# Patient Record
Sex: MALE | Race: WHITE | NOT HISPANIC OR LATINO | URBAN - METROPOLITAN AREA
[De-identification: names, ages, dates, MRNs, and addresses within clinical notes are randomized per-mention and may not be internally consistent; named-entity substitution may affect disease eponyms.]

---

## 2022-10-17 DIAGNOSIS — F31.9 BIPOLAR 1 DISORDER (HCC): Primary | ICD-10-CM

## 2022-10-17 NOTE — TELEPHONE ENCOUNTER
Patient left message that appt was missed and would need refills  Sending refill request to provider and message to  of appt needed  Medication Refill Request     Name of Medication gabapentin  Dose/Frequency 300mg and 100mg qhs  Quantity 30   Verified pharmacy   [x]  Verified ordering Provider   [x]  Does patient have enough for the next 3 days? Yes [] No [x]  Does patient have a follow-up appointment scheduled? Yes [] No [x]   If so when is appointment:APPT NEEDED, MESSAGE FORWARDED TO     Medication Refill Request     Name of Medication abilify  Dose/Frequency 30mg qd  Quantity 30  Verified pharmacy   [x]  Verified ordering Provider   [x]  Does patient have enough for the next 3 days? Yes [] No [x]  Does patient have a follow-up appointment scheduled? Yes [] No [x]   If so when is appointment: appt needed    Medication Refill Request     Name of Medication benztropine  Dose/Frequency 1mg qd  Quantity 30  Verified pharmacy   [x]  Verified ordering Provider   [x]  Does patient have enough for the next 3 days? Yes [] No [x]  Does patient have a follow-up appointment scheduled? Yes [] No [x]   If so when is appointment: appt needed    Medication Refill Request     Name of Medication fluvoxamine  Dose/Frequency 100mg (3) qhs  Quantity 90  Verified pharmacy   [x]  Verified ordering Provider   [x]  Does patient have enough for the next 3 days? Yes [] No [x]  Does patient have a follow-up appointment scheduled?  Yes [] No [x]   If so when is appointment: appt needed

## 2022-10-18 RX ORDER — GABAPENTIN 300 MG/1
300 CAPSULE ORAL
Qty: 30 CAPSULE | Refills: 0 | Status: SHIPPED | OUTPATIENT
Start: 2022-10-18

## 2022-10-18 RX ORDER — BENZTROPINE MESYLATE 1 MG/1
1 TABLET ORAL
Qty: 30 TABLET | Refills: 0 | Status: SHIPPED | OUTPATIENT
Start: 2022-10-18

## 2022-10-18 RX ORDER — ARIPIPRAZOLE 30 MG/1
30 TABLET ORAL DAILY
Qty: 30 TABLET | Refills: 0 | Status: SHIPPED | OUTPATIENT
Start: 2022-10-18

## 2022-10-18 RX ORDER — GABAPENTIN 100 MG/1
100 CAPSULE ORAL 2 TIMES DAILY
Qty: 60 CAPSULE | Refills: 0 | Status: SHIPPED | OUTPATIENT
Start: 2022-10-18

## 2022-10-18 RX ORDER — FLUVOXAMINE MALEATE 100 MG
300 TABLET ORAL
Qty: 90 TABLET | Refills: 0 | Status: SHIPPED | OUTPATIENT
Start: 2022-10-18

## 2022-10-25 ENCOUNTER — TELEMEDICINE (OUTPATIENT)
Dept: PSYCHIATRY | Facility: CLINIC | Age: 27
End: 2022-10-25

## 2022-10-25 DIAGNOSIS — F41.1 GENERALIZED ANXIETY DISORDER: ICD-10-CM

## 2022-10-25 DIAGNOSIS — F31.9 BIPOLAR 1 DISORDER (HCC): Primary | ICD-10-CM

## 2022-10-25 RX ORDER — HYDROCHLOROTHIAZIDE 25 MG/1
25 TABLET ORAL DAILY
COMMUNITY
Start: 2022-09-15

## 2022-10-25 RX ORDER — LOSARTAN POTASSIUM 50 MG/1
50 TABLET ORAL DAILY
COMMUNITY
Start: 2022-10-07

## 2022-10-25 RX ORDER — PANTOPRAZOLE SODIUM 40 MG/1
TABLET, DELAYED RELEASE ORAL
COMMUNITY
Start: 2022-10-12

## 2022-10-25 NOTE — PSYCH
Virtual Regular Visit    Verification of patient location:    Patient is located in the following state in which I hold an active license [place]      Assessment/Plan:  Pt will stop using THC  Continue meds    Problem List Items Addressed This Visit    None     Visit Diagnoses     Bipolar 1 disorder ([address] )    -  Primary    Generalized anxiety disorder              Goals addressed in session: Goal 1          Reason for visit is   Chief Complaint   Patient presents with   • Medication Management        Encounter provider Jnena Farias MD    Provider located at [address] [facility]  [address]  [phone]      Recent Visits  No visits were found meeting these conditions  Showing recent visits within past 7 days and meeting all other requirements  Today's Visits  Date Type Provider Dept   10/25/22 Telemedicine Jenna Farias, [facility] today's visits and meeting all other requirements  Future Appointments  No visits were found meeting these conditions  Showing future appointments within next 150 days and meeting all other requirements       The patient was identified by name and date of birth  Geno Berger was informed that this is a telemedicine visit and [facility] the [facility]  He agrees to proceed     My office door was closed  No one else was in the room  He acknowledged consent and understanding of privacy and security of the video platform  The patient has agreed to participate and understands they can discontinue the visit at any time  Patient is aware this is a billable service  Subjective  Geno Berger is a 32 y o  male with mood swings and anxiety presents for f/u   Compliant with meds, denies SE  Pt c/o feeling more anxious; mild mood swings  Denies stressors or medical problems     Increased THC use - liquid - 4-5 x day      HPI   Mood - elba swings from " normal" to depressed; 1-2 x week - low self esteem, sad - continues to work and do ADLs  Anxiety - increased obsessive /intrusive thoughts; "all the time'; " fear of going crazy" - for 1-2 weeks; needs reassurance from family  Past Medical History:   Diagnosis Date   • GERD (gastroesophageal reflux disease)    • Hypertension        No past surgical history on file  Current Outpatient Medications   Medication Sig Dispense Refill   • ARIPiprazole (ABILIFY) 30 mg tablet Take 1 tablet (30 mg total) by mouth daily 30 tablet 0   • benztropine (COGENTIN) 1 mg tablet Take 1 tablet (1 mg total) by mouth daily at bedtime 30 tablet 0   • fluvoxaMINE (LUVOX) 100 mg tablet Take 3 tablets (300 mg total) by mouth daily at bedtime 90 tablet 0   • gabapentin (NEURONTIN) 100 mg capsule Take 1 capsule (100 mg total) by mouth 2 (two) times a day 60 capsule 0   • gabapentin (NEURONTIN) 300 mg capsule Take 1 capsule (300 mg total) by mouth daily at bedtime 30 capsule 0   • hydrochlorothiazide (HYDRODIURIL) 25 mg tablet Take 25 mg by mouth daily     • losartan (COZAAR) 50 mg tablet Take 50 mg by mouth daily     • pantoprazole (PROTONIX) 40 mg tablet        No current facility-administered medications for this visit  Not on File    Review of Systems   Constitutional: Negative for activity change and appetite change  Psychiatric/Behavioral: Negative for sleep disturbance (6 hrs) and suicidal ideas  Video Exam    There were no vitals filed for this visit  Physical Exam  Constitutional:       Appearance: Normal appearance  He is normal weight  Neurological:      Mental Status: He is alert  Psychiatric:         Attention and Perception: Attention and perception normal          Mood and Affect: Mood is anxious  Affect is blunt  Speech: Speech normal          Behavior: Behavior is cooperative  Thought Content:  Thought content normal          Judgment: Judgment normal           Visit Time    Visit Start Time: 4 36 pm  Visit Stop Time: 4 47 pm  Total Visit Duration: 20

## 2022-12-23 DIAGNOSIS — F31.9 BIPOLAR 1 DISORDER (HCC): ICD-10-CM

## 2023-01-03 RX ORDER — GABAPENTIN 100 MG/1
100 CAPSULE ORAL 2 TIMES DAILY
Qty: 60 CAPSULE | Refills: 0 | Status: SHIPPED | OUTPATIENT
Start: 2023-01-03

## 2023-01-03 RX ORDER — GABAPENTIN 300 MG/1
300 CAPSULE ORAL
Qty: 30 CAPSULE | Refills: 0 | Status: SHIPPED | OUTPATIENT
Start: 2023-01-03

## 2023-01-03 RX ORDER — ARIPIPRAZOLE 30 MG/1
30 TABLET ORAL DAILY
Qty: 30 TABLET | Refills: 0 | Status: SHIPPED | OUTPATIENT
Start: 2023-01-03

## 2023-01-03 RX ORDER — BENZTROPINE MESYLATE 1 MG/1
1 TABLET ORAL
Qty: 30 TABLET | Refills: 0 | Status: SHIPPED | OUTPATIENT
Start: 2023-01-03

## 2023-01-03 RX ORDER — FLUVOXAMINE MALEATE 100 MG
300 TABLET ORAL
Qty: 90 TABLET | Refills: 0 | Status: SHIPPED | OUTPATIENT
Start: 2023-01-03

## 2023-02-16 ENCOUNTER — TELEMEDICINE (OUTPATIENT)
Dept: PSYCHIATRY | Facility: CLINIC | Age: 28
End: 2023-02-16

## 2023-02-16 DIAGNOSIS — F41.1 GENERALIZED ANXIETY DISORDER: ICD-10-CM

## 2023-02-16 DIAGNOSIS — F31.9 BIPOLAR 1 DISORDER (HCC): Primary | ICD-10-CM

## 2023-02-16 RX ORDER — GABAPENTIN 300 MG/1
300 CAPSULE ORAL
Qty: 30 CAPSULE | Refills: 1 | Status: SHIPPED | OUTPATIENT
Start: 2023-02-16

## 2023-02-16 RX ORDER — FLUVOXAMINE MALEATE 100 MG
300 TABLET ORAL
Qty: 90 TABLET | Refills: 1 | Status: SHIPPED | OUTPATIENT
Start: 2023-02-16

## 2023-02-16 RX ORDER — HALOPERIDOL 0.5 MG/1
0.5 TABLET ORAL
Qty: 30 TABLET | Refills: 1 | Status: SHIPPED | OUTPATIENT
Start: 2023-02-16

## 2023-02-16 RX ORDER — BENZTROPINE MESYLATE 1 MG/1
1 TABLET ORAL
Qty: 30 TABLET | Refills: 1 | Status: SHIPPED | OUTPATIENT
Start: 2023-02-16

## 2023-02-16 RX ORDER — ARIPIPRAZOLE 30 MG/1
30 TABLET ORAL DAILY
Qty: 30 TABLET | Refills: 1 | Status: SHIPPED | OUTPATIENT
Start: 2023-02-16

## 2023-02-16 NOTE — PATIENT INSTRUCTIONS
Stop neurontin 100 mg  bid, continue 300 mg hs - pt reports no effect  Continue other meds  Add haldol 0 5 mg hs  Pt was recommended to stop smoking weed

## 2023-02-16 NOTE — PSYCH
Virtual Regular Visit    Verification of patient location:    Patient is located in the following state in which I hold an active license [place]      Assessment/Plan:    Problem List Items Addressed This Visit    None  Visit Diagnoses     Bipolar 1 disorder ([address] )    -  Primary    Relevant Medications    gabapentin (NEURONTIN) 300 mg capsule    fluvoxaMINE (LUVOX) 100 mg tablet    benztropine (COGENTIN) 1 mg tablet    ARIPiprazole (ABILIFY) 30 mg tablet    haloperidol (HALDOL) 0 5 mg tablet    Generalized anxiety disorder        Relevant Medications    fluvoxaMINE (LUVOX) 100 mg tablet    ARIPiprazole (ABILIFY) 30 mg tablet    haloperidol (HALDOL) 0 5 mg tablet          Goals addressed in session: Goal 1          Reason for visit is   Chief Complaint   Patient presents with   • Medication Management        Encounter provider Sonia Lopez MD    Provider located at [address] [facility]  [address]  [phone]      Recent Visits  No visits were found meeting these conditions  Showing recent visits within past 7 days and meeting all other requirements  Today's Visits  Date Type Provider Dept   02/16/23 Telemedicine Sonia Lopez, [address] [place] [address] today's visits and meeting all other requirements  Future Appointments  No visits were found meeting these conditions  Showing future appointments within next 150 days and meeting all other requirements       The patient was identified by name and date of birth  Olga Yun was informed that this is a telemedicine visit and [facility] the [facility]  He agrees to proceed     My office door was closed  No one else was in the room  He acknowledged consent and understanding of privacy and security of the video platform  The patient has agreed to participate and understands they can discontinue the visit at any time      Patient is aware this is a billable service  Subjective  Troy Clark is a 29 y o  male with bipolar do and anxiety presents for f/u    Last appt in October, didn`t f/u as recommended  Continues to smoke weed 4-5 x day  Collateral obtained from mother - as per her, episodes of vomiting? Pt and his mother are poor historian    HPI   Mood - " very bad" - remains depressed, " all the time" - didn`t elaborate much  Anxiety - racing thoughts all the time, worse at night, restless, pacing, fear of "demons" and negative intrusive thoughts  Pt reports " thoughts are controlling me"    Past Medical History:   Diagnosis Date   • GERD (gastroesophageal reflux disease)    • Hypertension        History reviewed  No pertinent surgical history  Current Outpatient Medications   Medication Sig Dispense Refill   • ARIPiprazole (ABILIFY) 30 mg tablet Take 1 tablet (30 mg total) by mouth daily 30 tablet 1   • benztropine (COGENTIN) 1 mg tablet Take 1 tablet (1 mg total) by mouth daily at bedtime 30 tablet 1   • fluvoxaMINE (LUVOX) 100 mg tablet Take 3 tablets (300 mg total) by mouth daily at bedtime 90 tablet 1   • gabapentin (NEURONTIN) 300 mg capsule Take 1 capsule (300 mg total) by mouth daily at bedtime 30 capsule 1   • haloperidol (HALDOL) 0 5 mg tablet Take 1 tablet (0 5 mg total) by mouth daily at bedtime 30 tablet 1   • hydrochlorothiazide (HYDRODIURIL) 25 mg tablet Take 25 mg by mouth daily     • losartan (COZAAR) 50 mg tablet Take 50 mg by mouth daily     • pantoprazole (PROTONIX) 40 mg tablet        No current facility-administered medications for this visit  Not on File    Review of Systems   Constitutional: Negative for activity change and appetite change  Psychiatric/Behavioral: Positive for dysphoric mood and sleep disturbance (poor sleep)  Negative for suicidal ideas  The patient is nervous/anxious  Video Exam    There were no vitals filed for this visit      Physical Exam  Constitutional:       Appearance: Normal appearance  He is normal weight  Neurological:      Mental Status: He is alert  Psychiatric:         Attention and Perception: Perception normal  He is inattentive  Mood and Affect: Mood is anxious and depressed  Affect is blunt  Speech: Speech normal          Behavior: Behavior is cooperative  Thought Content:  Thought content normal          Judgment: Judgment normal           Visit Time    Visit Start Time: 5 59 pm   Visit Stop Time: 6 08 pm   Total Visit Duration: 17 minutes

## 2023-05-12 ENCOUNTER — TELEPHONE (OUTPATIENT)
Dept: PSYCHIATRY | Facility: CLINIC | Age: 28
End: 2023-05-12

## 2023-05-15 ENCOUNTER — TELEMEDICINE (OUTPATIENT)
Dept: PSYCHIATRY | Facility: CLINIC | Age: 28
End: 2023-05-15

## 2023-05-15 DIAGNOSIS — F42.9 OBSESSIVE-COMPULSIVE DISORDER, UNSPECIFIED TYPE: ICD-10-CM

## 2023-05-15 DIAGNOSIS — F31.9 BIPOLAR 1 DISORDER (HCC): Primary | ICD-10-CM

## 2023-05-15 NOTE — PSYCH
"  Virtual Regular Visit    Verification of patient location:    Patient is located at Other in the following state in which I hold an active license [place]      Assessment/Plan:    Problem List Items Addressed This Visit    None      Goals addressed in session: Goal 1          Reason for visit is   Chief Complaint   Patient presents with   • Medication Management        Encounter provider Karla Concepcion MD    Provider located at [address] [facility]  [address]  [phone]      Recent Visits  Date Type Provider Dept   05/12/23 Telephone Provider [address] [facility] recent visits within past 7 days and meeting all other requirements  Today's Visits  Date Type Provider Dept   05/15/23 Telemedicine Karla Concepcion, [facility] today's visits and meeting all other requirements  Future Appointments  No visits were found meeting these conditions  Showing future appointments within next 150 days and meeting all other requirements       The patient was identified by name and date of birth  Rosalinda Betancur was informed that this is a telemedicine visit and that the [facility]  He agrees to proceed     My office door was closed  No one else was in the room  He acknowledged consent and understanding of privacy and security of the video platform  The patient has agreed to participate and understands they can discontinue the visit at any time  Patient is aware this is a billable service       Subjective  Rosalinda Betancur is a 29 y o  male with bipolar do and anxiety presents for regular f/u     compliant with meds, denies SE  Reports cutting down weed use  Works  Denies acute medical problems        HPI   Mood - reports as \" same\", no improvement - depressed, restless  meds are not effective  Anxiety - continues to c/o racing / obsessive thoughts, \" all the " "time\"    Past Medical History:   Diagnosis Date   • GERD (gastroesophageal reflux disease)    • Hypertension        History reviewed  No pertinent surgical history  Current Outpatient Medications   Medication Sig Dispense Refill   • ARIPiprazole (ABILIFY) 30 mg tablet Take 1 tablet (30 mg total) by mouth daily 30 tablet 1   • benztropine (COGENTIN) 1 mg tablet Take 1 tablet (1 mg total) by mouth 2 (two) times a day 60 tablet 1   • fluvoxaMINE (LUVOX) 100 mg tablet Take 3 tablets (300 mg total) by mouth daily at bedtime 90 tablet 1   • gabapentin (NEURONTIN) 300 mg capsule Take 1 capsule (300 mg total) by mouth daily at bedtime 30 capsule 1   • haloperidol (HALDOL) 1 mg tablet Take 1 tablet (1 mg total) by mouth daily at bedtime 30 tablet 1   • hydrochlorothiazide (HYDRODIURIL) 25 mg tablet Take 25 mg by mouth daily     • losartan (COZAAR) 50 mg tablet Take 50 mg by mouth daily     • pantoprazole (PROTONIX) 40 mg tablet        No current facility-administered medications for this visit  Not on File    Review of Systems   Constitutional: Negative for activity change and appetite change  Psychiatric/Behavioral: Positive for dysphoric mood  Negative for sleep disturbance and suicidal ideas  The patient is nervous/anxious  Video Exam    There were no vitals filed for this visit  Physical Exam  Constitutional:       Appearance: Normal appearance  He is normal weight  Neurological:      Mental Status: He is alert  Psychiatric:         Attention and Perception: Attention and perception normal          Mood and Affect: Mood is anxious and depressed  Affect is blunt  Speech: Speech normal          Behavior: Behavior normal  Behavior is cooperative  Thought Content:  Thought content normal          Judgment: Judgment normal           Visit Time    Visit Start Time: 4 37 pm   Visit Stop Time: 4 48 pm   Total Visit Duration: 18 minutes      "

## 2023-05-24 ENCOUNTER — TELEMEDICINE (OUTPATIENT)
Dept: BEHAVIORAL/MENTAL HEALTH CLINIC | Facility: CLINIC | Age: 28
End: 2023-05-24

## 2023-05-24 DIAGNOSIS — F17.209 NICOTINE-RELATED DISORDER: ICD-10-CM

## 2023-05-24 DIAGNOSIS — F41.1 GENERALIZED ANXIETY DISORDER: Primary | ICD-10-CM

## 2023-05-24 DIAGNOSIS — F42.9 OBSESSIVE-COMPULSIVE DISORDER, UNSPECIFIED TYPE: ICD-10-CM

## 2023-05-24 DIAGNOSIS — F12.99 CANNABIS-RELATED DISORDER (HCC): ICD-10-CM

## 2023-05-24 DIAGNOSIS — F32.A DEPRESSION, UNSPECIFIED DEPRESSION TYPE: ICD-10-CM

## 2023-05-24 NOTE — PSYCH
"Client's mood was dysphoric and affect was restricted  Session occurred virtually  Client's thought content was logical and speech was within normal limits  Client's insight and judgement appeared good  Presenting Problem: Elias Ayala reported both he and his [facility] med provider, Dr Mik Saeed, co-referred him to services  Client explained his impetus for psychotherapy is \"the recent pattern of my mental health\" and \"same stuff I always have with intrusive thoughts and overanalyzing everything  \" Client noted his intrusive thoughts \"change a lot\" and usually consist of his fears such as \"what if I was a serial killer  \" Client added the thoughts usually concentrate on him engaging in some unwanted behavior or concern of bad things happening to him  Elias Ayala disclosed \"I don't do anything; I spend all weekend sleeping for an insane amount of hours  \" Client conveyed he predominately leaves the house solely to attend his job  Elias Ayala revealed \"I get fears of me going crazy\" and this causes him to avoid social situations  Client shared \"I feel like I'm in a cloud; I'm stuck  \" In regards to stressors, Lianaquincy Jamie explained \"I feel like me, myself, my own thoughts  \" Client denied external stressors  Client reported he initially began to flounder with his mental health at age 21 or 24 when it \"out of nowhere got bad  \" Client noted his current episode has lasted six years  Client identified coping as cannabis and video games  Administered a DSM-5-TR Self-Rated Level 1 Cross-Cutting Symptom Measure and he produced elevated scores in the domains of depression, anger, anxiety, somatic symptoms, suicidal ideation, sleep problems, memory, repetitive thoughts/behaviors, dissociation, personality functioning, and substance use (see scan)   Additional information was gathered on the following item: 17- Client expressed \"I tell myself constantly that I'm not what I think I am or I ask my mom the same question over and over again until I feel " "comfortable not asking it  \"      Administered a PHQ-9 and he screened moderate severity with a score of 13  Administered a ELIZABETH-7 and he screened severe severity with a score of 17  Administered an OCI-R and he screened negative for OCD with a score of 12 (see scan)  Administered The Mood Disorder Questionnaire and he screened negative for bipolar disorder (see scan)  Administered an ACE Questionnaire and he scored a 4 (see scan)  Risk: María Bruno revealed SI consists of \"I'd be better off dead; not that'd I do anything to myself; I just lost hope  \" Client denied plan/intent for suicide  Client denied HI  María Bruno shared he encounters thoughts of \"what if I were to hurt or kill a loved one\" and \"deep down I know I would never do these things  \" Client described the thoughts as a \"stuck thought\" and thoughts that present against his will  Client denied SIB and previous suicide attempt  Medication: María Bruno stated he is prescribed Abilify-dosage unknown-once daily  Legal: Client denied legal history  Culture: María Bruno identified as White, Male, and Straight  Client denied [employer]  Physical Health: Client denied current physiological ailments  María Bruno conveyed he suffered two concussions at either age 12 or 16  Previous  Treatment: María Bruno conveyed he has been actively involved in treatment at [facility] for four years with his most recent individual psychotherapy in 2021  Client denied history of psychiatric hospitalizations  Client expressed he attended [facility] for two days of PHP in 2018  Substance Use:   Alcohol: Client reported first use at age 16 with most recent use being a few weeks ago  Client noted he uses alcohol five to seven days per month and will consume four to five drinks per episode  Cannabis: María Bruno noted first use at age 13 and most recent use being today  Client noted daily use via vaping  Client explained someone obtains cannabis for him from a medical dispensary   " "   Nicotine: Client conveyed first use was age 13 and most recent use was today  Client noted daily use of chewing tobacco, long cut, where it takes him two days to complete one tin  Caffeine: Elizabeth Arzate stated he uses three to four days per week and he will consume two red solo cups of soda per episode  Trauma: Client denied emotional, sexual, and physical abuse  Client conveyed his parents were \"addicts\" during his childhood and he \"grew up poor  \" Elizabeth Arzate revealed at age 12 his mother overdosed on Xanax and at age 21 he walked in on his father attempting suicide  Education: Elizabeth Arzate stated he graduated high school in 2014  Vocation: 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  Client noted he has been there for four years and currently completes 50 hours weekly  Family: Elizabeth Arzate shared his biological parents are   Client noted the relationship with his father is \"not bad; we could talk more but it's not bad  \" Client expressed the relationship with his mother is \"real close  \" Client revealed he has two full sisters, ages 32 and 24, and one maternal half brother who is age 35  Client added none of his relationships with his siblings stand out for being especially good or especially poor  Client denied children and a current romantic partner  Elizabeth Arzate disclosed \"I have lost a lot of friends because of my mental issues  \" Client noted he has two friends currently  Pertaining to family history of mental illness, client explained his mother has anxiety and his father has depression  Client added his youngest sister experiences anxiety and intrusive thoughts uvaldo to him  Elizabeth Arzate conveyed his parents previously struggled with misuse of prescription pain pills  Client added his brother was a \"heroine addict\" and is presently clean  Goals: Elizabeth Arzate stated Piyush Peer how to be my normal self again\" and \"learn how to control my thoughts  \"      Summary: Client is a 29year old, White, Straight, male who " co-referred to services with his [facility] med provider  Client presents with symptoms reflective of an anxiety disorder, an obsessive-compulsive and related disorder, a depressive disorder, a nicotine use disorder and a cannabis use disorder  Clinical areas of significance include chronic intrusive thoughts, developmental trauma, nicotine and cannabis use, and limited social network  Client will augment pharmacotherapy with individual psychotherapy  Next session review the screener results, complete a treatment plan and a crisis plan, and provide expectations for therapy  1  Generalized anxiety disorder        2  Obsessive-compulsive disorder, unspecified type        3  Depression, unspecified depression type        4  Nicotine-related disorder        5  Cannabis-related disorder ([facility] [address] )              Visit start and stop times:    05/25/23  Start Time: 1700  Stop Time: 1759  Total Visit Time: 59 minutes      REQUIRED DOCUMENTATION:   1  This service was provided via [facility]  2  Provider located at [address]  [phone]  3  [facility] provider: VANCE Alaniz   4  Identify all parties in room with patient during [facility] visit: None  5  After connecting through [facility] [facility], patient was identified by name and date of birth  Patient was then informed that this was a Telemedicine visit and that the exam was being conducted confidentially over secure lines  My office door was closed  No one else was in the room  Patient acknowledged consent and understanding of privacy and security of the Telemedicine visit  I informed the patient that I have reviewed their record in Epic and presented the opportunity for them to ask any questions regarding the visit today  The patient agreed to participate

## 2023-05-25 PROBLEM — F17.209 NICOTINE-RELATED DISORDER: Status: ACTIVE | Noted: 2023-05-25

## 2023-05-25 PROBLEM — F41.1 GENERALIZED ANXIETY DISORDER: Status: ACTIVE | Noted: 2023-05-25

## 2023-05-25 PROBLEM — F12.99 CANNABIS-RELATED DISORDER (HCC): Status: ACTIVE | Noted: 2023-05-25

## 2023-05-25 PROBLEM — F32.A DEPRESSION, UNSPECIFIED: Status: ACTIVE | Noted: 2023-05-25

## 2023-05-25 PROBLEM — F42.9 OBSESSIVE-COMPULSIVE DISORDER, UNSPECIFIED: Status: ACTIVE | Noted: 2023-05-25

## 2023-06-07 ENCOUNTER — DOCUMENTATION (OUTPATIENT)
Dept: BEHAVIORAL/MENTAL HEALTH CLINIC | Facility: CLINIC | Age: 28
End: 2023-06-07

## 2023-06-07 NOTE — PROGRESS NOTES
"Clinician sent an invitation to the client's phone for his virtual individual therapy session  At the time of the session, 5pm, client communicated via [facility] under Reason For Cancellation:\"still at work    can i reschedule? \" Clinician proceeded to call Vinh Harvey and spoke with him   Mutually agreed to meet Wed, 6-21-23, at 6 pm    "

## 2023-06-21 ENCOUNTER — TELEMEDICINE (OUTPATIENT)
Dept: BEHAVIORAL/MENTAL HEALTH CLINIC | Facility: CLINIC | Age: 28
End: 2023-06-21
Payer: COMMERCIAL

## 2023-06-21 DIAGNOSIS — F41.1 GENERALIZED ANXIETY DISORDER: Primary | ICD-10-CM

## 2023-06-21 DIAGNOSIS — F42.9 OBSESSIVE-COMPULSIVE DISORDER, UNSPECIFIED TYPE: ICD-10-CM

## 2023-06-21 DIAGNOSIS — F17.209 NICOTINE-RELATED DISORDER: ICD-10-CM

## 2023-06-21 DIAGNOSIS — F32.A DEPRESSION, UNSPECIFIED DEPRESSION TYPE: ICD-10-CM

## 2023-06-21 DIAGNOSIS — F12.99 CANNABIS-RELATED DISORDER (HCC): ICD-10-CM

## 2023-06-21 PROCEDURE — 90834 PSYTX W PT 45 MINUTES: CPT | Performed by: COUNSELOR

## 2023-06-21 NOTE — BH TREATMENT PLAN
"[address] 1995     Date of Initial Psychotherapy Assessment: 5-24-23   Date of Current Treatment Plan: 06/21/23  Treatment Plan Target Date: 10-21-23  Treatment Plan Expiration Date: 10-21-23    Diagnosis:   1  Generalized anxiety disorder        2  Obsessive-compulsive disorder, unspecified type        3  Depression, unspecified depression type        4  Nicotine-related disorder        5  Cannabis-related disorder ([address] )            Symptoms: Client endorsed sadness, anhedonia, low self-esteem, hopelessness, appetite disturbance, suicidal ideation, irritability, shaking, sweating, muscle tension, restlessness, excessive worry, obsessions, and racing thoughts  Intervention: CBT and ACT to address anxiety and obsessions      Long Term Goal: Client reported 'I want to learn how to control my thinking  \"     Short Term Goal: Reduce worrying       Outcome Goal: Client will report developing one to two new coping skills to manage worry      Short Term Goal: Explore personal fears       Outcome Goal: Client will report greater confidence in the stability of his mental health     Strengths: socializing, listen to music, humor      Discharge: Client shared \"when I start to feel like myself again  When I feel comfortable and confident in myself  \"    Importance (1-10): 10       I am currently under the care of a [facility] psychiatric provider: yes    My [facility] psychiatric provider is: Dr Florentino Clark    I am currently taking psychiatric medications: Yes, but not as prescribed  (explain) Client noted he occasionaly forgets to take medication  For children and adults who have a legal guardian:   Has there been any change to custody orders and/or guardianship status? NA  If yes, attach updated documentation      I have created my Crisis Plan and have been offered a copy of this plan    [address]: Diagnosis and Treatment Plan " explained to Siria Abernathy acknowledges an understanding of their diagnosis  Anne Marie Bates agrees to this treatment plan  I have been offered a copy of this Treatment Plan   no

## 2023-06-21 NOTE — PSYCH
Behavioral Health Psychotherapy Progress Note    Psychotherapy Provided: Individual Psychotherapy     1  Generalized anxiety disorder        2  Obsessive-compulsive disorder, unspecified type        3  Depression, unspecified depression type        4  Nicotine-related disorder        5  Cannabis-related disorder (HCC)               DATA: Client's mood was neutral and affect was blunted  Client denied changes since the intake  Client denied plan and intent for suicide  During this session, this clinician used the following therapeutic modalities: CBT    Substance Abuse was not addressed during this session  If the client is diagnosed with a co-occurring substance use disorder, please indicate any changes in the frequency or amount of use: Unknown  Stage of change for addressing substance use diagnoses: Contemplation      ASSESSMENT: Reviewed the results of the screeners with Lara Banda and inquired of his opinion of said results  Mutually completed an initial crisis plan and an initial treatment plan  Conversed on goals pertaining to worry and to fears  Lara Banda expressed a desire to reduce worrying with an outcome of developing one to two new coping skills to manage worry  Client conveyed he wants to explore personal fears with an outcome of greater confidence in the stability of his mental health  Clinician emailed Lara Banda instructions on establishing his My Chart account to enable him to sign both the crisis plan and the treatment plan  Provided Lara Banda with expectations for therapy and for the therapeutic relationship  Arcelia Hendrix presents with a minimal risk of suicide, none risk of self-harm, and none risk of harm to others  A safety plan was indicated: no      PLAN: Client will focus on newly constructed treatment objectives  Behavioral Health Treatment Plan and Discharge Planning: Arcelia Hendrix is aware of and agrees to continue to work on their treatment plan   They have identified and are working toward their discharge goals  yes    Visit start and stop times:    06/21/23  Start Time: 1800  Stop Time: 1845  Total Visit Time: 45 minutes      REQUIRED DOCUMENTATION:   1  This service was provided via [facility]  2  Provider located at [address]  [phone]  3  [facility] provider: VANCE Shea   4  Identify all parties in room with patient during [facility] visit: None  5  After connecting through televideo, patient was identified by name and date of birth  Patient was then informed that this was a Telemedicine visit and that the exam was being conducted confidentially over secure lines  My office door was closed  No one else was in the room  Patient acknowledged consent and understanding of privacy and security of the Telemedicine visit  I informed the patient that I have reviewed their record in Epic and presented the opportunity for them to ask any questions regarding the visit today  The patient agreed to participate

## 2023-06-21 NOTE — BH CRISIS PLAN
"Client Name: Naz Guevara       Client YOB: 1995  : 1995    Treatment Team (include name and contact information):     Psychotherapist: Jac Sanchez    Psychiatrist: Dr Caden Olivarez of information completed: [address] Provider  Luis Alfredo Torres MD  [address]  [address]    Type of Plan   * Plans for all individuals 15 yo and above must be signed by the client  Plan Type: adolescent/adult (14 and over) Initial      My Personal Strengths are (in the client's own words):   Client reported \"humor and outgoing  \"    The stressors and triggers that may put me at risk are:   Client conveyed \"being stressed at work  A lot of times it's just there automatically  \"     Coping skills I can use to keep myself calm and safe:   Client stated \"listen to music  \"     Coping skills/supports I can use to maintain abstinence from substance use:    Client stated \"listen to music  \"     The people that provide me with help and support: (Include name, contact, and how they can help)      Support person #1: [address]     * Phone number: in cell phone    * How can they help me? Client noted Elizabeth Bestkyle reassures me a lot of times  \"       Support person #2: Client denied additional supports  * Phone number: N/A    * How can they help me? N/A      In the past, the following has helped me in times of crisis:    Client shared \"time; eventually it just goes away  \"       If it is an emergency and you need immediate help, call     If there is a possibility of danger to yourself or others, call the following crisis hotline resources:     Adult Crisis Numbers  Suicide Prevention Hotline - Dial   [facility]: [address]: [address]: [address]: [phone]  [address] [place] [address] ([facility]): [phone]  [facility]: [address] [place] [address] Avenue: [address] [place] [address] St: [phone] (daytime)   " [phone] (after hours, weekends, holidays)     Child/Adolescent Crisis Numbers   [facility] \[facility]\"": [address]: [phone]   Roberto Driscoll: [phone]   [address] [place] [address] ([facility]): [phone]    Please note: Some counties do not have a separate number for Child/Adolescent specific crisis  If your county is not listed under Child/Adolescent, please call the adult number for your county     National Talk to Text Line   All Ages - [phone]    In the event your feelings become unmanageable, and you cannot reach your support system, you will call 911 immediately or go to the nearest hospital emergency room

## 2023-07-10 ENCOUNTER — TELEMEDICINE (OUTPATIENT)
Dept: PSYCHIATRY | Facility: CLINIC | Age: 28
End: 2023-07-10

## 2023-07-10 DIAGNOSIS — F31.9 BIPOLAR 1 DISORDER (HCC): ICD-10-CM

## 2023-07-10 RX ORDER — GABAPENTIN 300 MG/1
300 CAPSULE ORAL
Qty: 30 CAPSULE | Refills: 1 | Status: SHIPPED | OUTPATIENT
Start: 2023-07-10

## 2023-07-10 RX ORDER — BENZTROPINE MESYLATE 1 MG/1
1 TABLET ORAL 2 TIMES DAILY
Qty: 60 TABLET | Refills: 1 | Status: SHIPPED | OUTPATIENT
Start: 2023-07-10

## 2023-07-10 RX ORDER — FLUVOXAMINE MALEATE 100 MG
300 TABLET ORAL
Qty: 90 TABLET | Refills: 1 | Status: SHIPPED | OUTPATIENT
Start: 2023-07-10

## 2023-07-10 RX ORDER — ARIPIPRAZOLE 30 MG/1
30 TABLET ORAL DAILY
Qty: 30 TABLET | Refills: 1 | Status: SHIPPED | OUTPATIENT
Start: 2023-07-10

## 2023-07-10 NOTE — PSYCH
No Call. No Show.  No Charge    Pt was at work and unbale to participate in psych eval  Will reschedule

## 2023-07-11 ENCOUNTER — TELEPHONE (OUTPATIENT)
Dept: PSYCHIATRY | Facility: CLINIC | Age: 28
End: 2023-07-11

## 2023-07-11 NOTE — TELEPHONE ENCOUNTER
Called and left message for client to reschedule appt. Client to be scheduled for Psych Evaluation per Dr. Ananya Ortega.

## 2023-07-26 ENCOUNTER — TELEMEDICINE (OUTPATIENT)
Dept: BEHAVIORAL/MENTAL HEALTH CLINIC | Facility: CLINIC | Age: 28
End: 2023-07-26
Payer: COMMERCIAL

## 2023-07-26 DIAGNOSIS — F41.1 GENERALIZED ANXIETY DISORDER: Primary | ICD-10-CM

## 2023-07-26 DIAGNOSIS — F42.9 OBSESSIVE-COMPULSIVE DISORDER, UNSPECIFIED TYPE: ICD-10-CM

## 2023-07-26 DIAGNOSIS — F17.209 NICOTINE-RELATED DISORDER: ICD-10-CM

## 2023-07-26 DIAGNOSIS — F32.A DEPRESSION, UNSPECIFIED DEPRESSION TYPE: ICD-10-CM

## 2023-07-26 DIAGNOSIS — F12.99 CANNABIS-RELATED DISORDER (HCC): ICD-10-CM

## 2023-07-26 PROCEDURE — 90834 PSYTX W PT 45 MINUTES: CPT | Performed by: COUNSELOR

## 2023-07-26 NOTE — PSYCH
Behavioral Health Psychotherapy Progress Note    Psychotherapy Provided: Individual Psychotherapy     1. Generalized anxiety disorder        2. Obsessive-compulsive disorder, unspecified type        3. Depression, unspecified depression type        4. Nicotine-related disorder        5. Cannabis-related disorder (HCC)              DATA: Client's mood was neutral and affect was restricted. Client reported during his recent family vacation his "head was going crazy the whole time" and greatly impaired his enjoyment. During this session, this clinician used the following therapeutic modalities: ACT and CBT    Substance Abuse was not addressed during this session. If the client is diagnosed with a co-occurring substance use disorder, please indicate any changes in the frequency or amount of use: Unknown. Stage of change for addressing substance use diagnoses: Contemplation    ASSESSMENT:  Discussed his experience of anxiety and Sean Purdyrigoberto stated chronic obsessive thoughts can result in restlessness and pacing as well as occasional vomiting due to the intensity of the racing thoughts. Processed his thoughts further and client revealed "I'm constantly dealing with intrusive thoughts" and the most prevalent one centers on his concern that he will "go crazy" and lose control of his mind. Inquired of what previous coping he has found useful in managing his thoughts and Sean Purdyrigoberto identified journaling. Provided psychoed on mindfulness and how this can bring some reprieve from the constant onslaught of thoughts. Mutually watched a video titled What is Mindfulness by Gini Pearson and talked on Raymundo's opinion of the material. Client revealed thinking about past obsessions can re-ignite them and his dwelling on "bad thoughts" leads him to believe they are factual. Client added the rumination prevents actions and makes it arduous for him to adjust to things outside of his daily routine.  Clinician introduced [facility] and Ambreen Garrison expressed interest so the clinician completed a referral in [facility]. Clinician emailed Ambreen Garrison a link to the site [employer]. [employer] for the section titled How to Meditate, as well as directed him to the apps Headspace and Insight Timer. Monique Weinberg presents with a none risk of suicide, none risk of self-harm, and none risk of harm to others. A safety plan was indicated: no      PLAN:  Client will consume the mindfulness material and engage with the apps. Behavioral Health Treatment Plan and Discharge Planning: Monique Weinberg is aware of and agrees to continue to work on their treatment plan. They have identified and are working toward their discharge goals. yes    Visit start and stop times:    07/26/23  Start Time: 1801  Stop Time: 1849  Total Visit Time: 48 minutes      REQUIRED DOCUMENTATION:   1. This service was provided via [employer]. 2. Provider located at [address] [place] [address]  [phone]. 3. [facility] provider: VANCE Hogan.  4. Identify all parties in room with patient during [facility] visit: None  5. After connecting through [employer], patient was identified by name and date of birth. Patient was then informed that this was a Telemedicine visit and that the exam was being conducted confidentially over secure lines. My office door was closed. No one else was in the room. Patient acknowledged consent and understanding of privacy and security of the Telemedicine visit. I informed the patient that I have reviewed their record in Epic and presented the opportunity for them to ask any questions regarding the visit today. The patient agreed to participate.

## 2023-08-01 ENCOUNTER — TELEPHONE (OUTPATIENT)
Dept: PSYCHIATRY | Facility: CLINIC | Age: 28
End: 2023-08-01

## 2023-08-01 NOTE — TELEPHONE ENCOUNTER
Outreach call placed to follow up on voice message about client setting up a Psych Eval with Dr Nany Tovar. Got client's voicemail.  I left a detailed message requesting a call back to [phone]

## 2023-08-23 ENCOUNTER — DOCUMENTATION (OUTPATIENT)
Dept: BEHAVIORAL/MENTAL HEALTH CLINIC | Facility: CLINIC | Age: 28
End: 2023-08-23

## 2023-08-23 NOTE — PROGRESS NOTES
Clinician sent Raymundo a link to his phone for his virtual individual therapy session and client did not present.  Clinician called twice and recorded a voice message encouraging Raymundo to call to schedule his next appointment.

## 2023-10-03 ENCOUNTER — TELEPHONE (OUTPATIENT)
Dept: PSYCHIATRY | Facility: CLINIC | Age: 28
End: 2023-10-03

## 2023-10-03 ENCOUNTER — DOCUMENTATION (OUTPATIENT)
Dept: BEHAVIORAL/MENTAL HEALTH CLINIC | Facility: CLINIC | Age: 28
End: 2023-10-03

## 2023-10-03 DIAGNOSIS — F41.1 GENERALIZED ANXIETY DISORDER: Primary | ICD-10-CM

## 2023-10-03 DIAGNOSIS — F12.99 CANNABIS-RELATED DISORDER (HCC): ICD-10-CM

## 2023-10-03 DIAGNOSIS — F17.209 NICOTINE-RELATED DISORDER: ICD-10-CM

## 2023-10-03 DIAGNOSIS — F42.9 OBSESSIVE-COMPULSIVE DISORDER, UNSPECIFIED TYPE: ICD-10-CM

## 2023-10-03 DIAGNOSIS — F32.A DEPRESSION, UNSPECIFIED DEPRESSION TYPE: ICD-10-CM

## 2023-10-03 NOTE — TELEPHONE ENCOUNTER
Outreach call placed. Medical Assistance termed on 8/31/23. Requested a call back [phone] with updated insurance information.

## 2023-10-03 NOTE — PROGRESS NOTES
Psychotherapy Discharge Summary    Preferred Name: Ana M Leonard  YOB: 1995    Admission Date to Psychotherapy: 5-24-23    Referred by: Nestor Villa reported both he and his [facility] med provider, Dr. Oscar Guevara, co-referred him to services. Presenting Problem: The following information was obtained by Pastor Jovel at the time of intake: Client explained his impetus for psychotherapy is "the recent pattern of my mental health" and "same stuff I always have with intrusive thoughts and overanalyzing everything." Client noted his intrusive thoughts "change a lot" and usually consist of his fears such as "what if I was a serial killer." Client added the thoughts usually concentrate on him engaging in some unwanted behavior or concern of bad things happening to him. Nestor Villa disclosed "I don't do anything; I spend all weekend sleeping for an insane amount of hours." Client conveyed he predominately leaves the house solely to attend his job. Nestor Villa revealed "I get fears of me going crazy" and this causes him to avoid social situations. Client shared "I feel like I'm in a cloud; I'm stuck." In regards to stressors, Nestor Villa explained "I feel like me, myself, my own thoughts." Client denied external stressors. Client reported he initially began to flounder with his mental health at age 21 or 24 when it "out of nowhere got bad." Client noted his current episode has lasted six years. Client identified coping as cannabis and video games. Course of Treatment: Client attended 2 sessions of individual therapy post intake. Treatment focused on anxiety, obsessive thoughts, mindfulness/meditation, and a referral to [facility]. Treatment Complications (if any): N/A    Treatment Progress: Progress is indeterminate due to the brevity of care. Current [facility] Provider: Dr. Oscar Guevara    Discharge Medications: Please see Dr. Caceres Cons notes for relevant information.      Discharge Date: 10-3-23    Discharge Diagnosis:   1. Generalized anxiety disorder        2. Obsessive-compulsive disorder, unspecified type        3. Depression, unspecified depression type        4. Nicotine-related disorder        5. Cannabis-related disorder [facility])            Criteria for Discharge: Client most recently attended a session on 7-26-23. Client did not show for his most recent scheduled appointment on 8-23-23. Client did not respond to a voice message recorded on 8-23-23 nor an outreach letter mailed 9-19-23. Client is discharged due to inactivity.        Aftercare Recommendations: N/A    Prognosis: fair

## 2023-10-12 ENCOUNTER — TELEMEDICINE (OUTPATIENT)
Dept: PSYCHIATRY | Facility: CLINIC | Age: 28
End: 2023-10-12
Payer: COMMERCIAL

## 2023-10-12 DIAGNOSIS — F32.A DEPRESSION, UNSPECIFIED DEPRESSION TYPE: ICD-10-CM

## 2023-10-12 DIAGNOSIS — F42.9 OBSESSIVE-COMPULSIVE DISORDER, UNSPECIFIED TYPE: Primary | ICD-10-CM

## 2023-10-12 PROCEDURE — 90792 PSYCH DIAG EVAL W/MED SRVCS: CPT | Performed by: PSYCHIATRY & NEUROLOGY

## 2023-10-12 RX ORDER — BENZTROPINE MESYLATE 1 MG/1
1 TABLET ORAL 2 TIMES DAILY
Qty: 60 TABLET | Refills: 1 | Status: SHIPPED | OUTPATIENT
Start: 2023-10-12

## 2023-10-12 RX ORDER — FLUVOXAMINE MALEATE 100 MG
300 TABLET ORAL
Qty: 90 TABLET | Refills: 1 | Status: SHIPPED | OUTPATIENT
Start: 2023-10-12

## 2023-10-12 RX ORDER — ARIPIPRAZOLE 30 MG/1
30 TABLET ORAL DAILY
Qty: 30 TABLET | Refills: 1 | Status: SHIPPED | OUTPATIENT
Start: 2023-10-12

## 2023-10-12 RX ORDER — RISPERIDONE 1 MG/1
1 TABLET ORAL
Qty: 30 TABLET | Refills: 1 | Status: SHIPPED | OUTPATIENT
Start: 2023-10-12

## 2023-10-12 NOTE — PSYCH
Visit Time    Visit Start Time: 4.56 pm   Visit Stop Time: 5.20 pm   Total Visit Duration:  60 minutes    Reason for visit:   Chief Complaint   Patient presents with    Psychiatric Evaluation       HPI     Jewels Alvarez is a 29 y.o. male with a history of Anxiety and Depression who presents for psychiatric evaluation due to no effect on meds. Pt is known to me; he asked for psych eval b/o no improvement on meds  He started to see psychiatrist in HS, c/o " being obsessed with my health"; " searching the internet for my symptoms"; " scared to die"; " cloud in my head"; " confused". He was admitted to [facility] and dx with OCD and anxiety. Pt stayed [facility]  week; was not in treatment for 3-4 years and came back to  at [age] y.o  Pt denies h/o reva; reports h/o depression and anxiety  Denies admissions or SA  PMH - HTN on meds  H/o trauma - parents and older brother were using drugs while he was growing up - everyone is sober now  Pt smokes weed 2 x week, " all day" - " sometimes it makes me feel worse; sometimes better"  Pt works  Failed zoloft, lexapro,   Currently on luvox, abilify, haldol, cogentin, neurontin  SE - akathisia ; neurontin and haldol is not effective; abilify " worked in the beginning"      Review Of Systems:     Mood Gets depressed 2 x week, for all day - sad, hopeless, isolated, increased appetite, increased sleep; sometime death wishes.  does ADLs   Behavior Normal    Thought Content " Bizarre " thoughts, " obsessed about something"; " imagine negative things"" messed up" - daily, all the time, make him feel depressed; " nothing helps"   General Decreased Functioning   Personality Normal   Other Psych Symptoms Sometimes gets angry   Constitutional Normal   ENT Normal   Cardiovascular Normal    Respiratory Normal    Gastrointestinal Normal   Genitourinary Normal    Musculoskeletal Negative   Integumentary Normal    Neurological Normal    Endocrine Normal    Other Symptoms Normal        Past Psychiatric History:      Past Inpatient Psychiatric Treatment:   Therapy, Out Patient PHP at [facility]   Past Outpatient Psychiatric Treatment:    individual therapy  Past Suicide Attempts:    no  Past Violent Behavior:    no  Past Psychiatric Medication Trials:    Zoloft, Lexapro, Luvox, Neurontin, Abilify, and Haldol    Family Psychiatric History: History reviewed. No pertinent family history. Social History:    Education: high school diploma/GED  Learning Disabilities:  none  Marital history: single  Living arrangement, social support:  parents, brother.   Occupational History: employed  Functioning Relationships: not very social.  Other Pertinent History:  as above      Social History     Substance and Sexual Activity   Drug Use Not on file       Traumatic History:       Abuse:  denies  Other Traumatic Events:  parents were using drugs    The following portions of the patient's history were reviewed and updated as appropriate: problem list.     Social History     Socioeconomic History    Marital status: Single     Spouse name: Not on file    Number of children: Not on file    Years of education: Not on file    Highest education level: Not on file   Occupational History    Not on file   Tobacco Use    Smoking status: Not on file    Smokeless tobacco: Not on file   Substance and Sexual Activity    Alcohol use: Not on file    Drug use: Not on file    Sexual activity: Not on file   Other Topics Concern    Not on file   Social History Narrative    Not on file     Social Determinants of Health     Financial Resource Strain: Not on file   Food Insecurity: Not on file   Transportation Needs: Not on file   Physical Activity: Not on file   Stress: Not on file   Social Connections: Not on file   Intimate Partner Violence: Not on file   Housing Stability: Not on file     Social History     Social History Narrative    Not on file       Mental status:  Appearance Looks unkempt and tired   Mood depressed   Affect affect was blunted   Speech a normal rate   Thought Processes C/o obsessive thoughts; all the time - see above   Hallucinations no hallucinations present    Thought Content Bizarre, imaginative thoughts; " messed up"   Abnormal Thoughts obsessive thought content   Orientation   N/a   Remote Memory N/a   Attention Span concentration intact   Intellect Appears to be of Average Intelligence   Insight Limited insight   Judgement judgment was limited   Muscle Strength N/a   Language Normal   Fund of Knowledge displays adequate knowledge of current events   Pain none   Pain Scale 0       Laboratory Results: n/a    Assessment/Plan:  There are no diagnoses linked to this encounter. Impression - OCD, depression nos    Treatment Recommendations- Risks Benefits      Immediate Medical/Psychiatric/Psychotherapy Treatments and Any Precautions: will stop neurontin and haldol and start cross taper to risperdal    Risks, Benefits And Possible Side Effects Of Medications:  Risks, benefits, and possible side effects of medications explained to patient and patient verbalizes understanding    Controlled Medication Discussion: No records found for controlled prescriptions according to [address]   TREATMENT PLAN (Medication Management Only)        [facility]    Name and Date of Birth:  Jarret Denton 29 y.o. 1995  Date of Treatment Plan: October 12, 2023  Diagnosis/Diagnoses:  No diagnosis found. Strengths/Personal Resources for Self-Care: taking medications as prescribed. Area/Areas of need (in own words): anxiety  1. Long Term Goal: continue improvement in anxiety. Target Date:6 months - 4/12/2024  Person/Persons responsible for completion of goal: Xiao Deleon  2. Short Term Objective (s) - How will we reach this goal?:   A.  Provider new recommended medication/dosage changes and/or continue medication(s): continue current medications as prescribed Luvox, Risperdal, Abilify. HARPAL N/BARBARA.  C. N/A. Target Date:6 months - 4/12/2024  Person/Persons Responsible for Completion of Goal: Jewels Alvarez  Progress Towards Goals: continuing treatment  Treatment Modality: medication management every 4 weeks  Review due 180 days from date of this plan: 6 months - 4/12/2024  Expected length of service: ongoing treatment  My Physician/PA/NP and I have developed this plan together and I agree to work on the goals and objectives. I understand the treatment goals that were developed for my treatment.

## 2023-10-12 NOTE — PATIENT INSTRUCTIONS
Stop haldol and neurontin - no effect  Will start risperdal 1 mg hs - paln to cross taper form abilify if effective  Continue luvox

## 2023-11-16 ENCOUNTER — TELEMEDICINE (OUTPATIENT)
Dept: PSYCHIATRY | Facility: CLINIC | Age: 28
End: 2023-11-16
Payer: COMMERCIAL

## 2023-11-16 DIAGNOSIS — F42.9 OBSESSIVE-COMPULSIVE DISORDER, UNSPECIFIED TYPE: Primary | ICD-10-CM

## 2023-11-16 DIAGNOSIS — F31.9 BIPOLAR 1 DISORDER (HCC): ICD-10-CM

## 2023-11-16 DIAGNOSIS — F41.1 GENERALIZED ANXIETY DISORDER: ICD-10-CM

## 2023-11-16 PROCEDURE — 99214 OFFICE O/P EST MOD 30 MIN: CPT | Performed by: PSYCHIATRY & NEUROLOGY

## 2023-11-16 RX ORDER — ARIPIPRAZOLE 20 MG/1
20 TABLET ORAL DAILY
Qty: 30 TABLET | Refills: 1 | Status: SHIPPED | OUTPATIENT
Start: 2023-11-16

## 2023-11-16 RX ORDER — ARIPIPRAZOLE 5 MG/1
5 TABLET ORAL DAILY
Qty: 30 TABLET | Refills: 1 | Status: SHIPPED | OUTPATIENT
Start: 2023-11-16

## 2023-11-16 RX ORDER — RISPERIDONE 2 MG/1
2 TABLET ORAL 2 TIMES DAILY
Qty: 30 TABLET | Refills: 1 | Status: SHIPPED | OUTPATIENT
Start: 2023-11-16

## 2023-11-16 NOTE — PSYCH
Virtual Regular Visit    Verification of patient location:    Patient is located at Home in the following state in which I hold an active license [place]      Assessment/Plan:    Problem List Items Addressed This Visit    None      Goals addressed in session: Goal 1          Reason for visit is   Chief Complaint   Patient presents with    Medication Management        Encounter provider Elyse Hinds MD    Provider located at [address] [place] [address],[address]  [address]  [phone]      Recent Visits  No visits were found meeting these conditions. Showing recent visits within past 7 days and meeting all other requirements  Today's Visits  Date Type Provider Dept   11/16/23 Telemedicine Elyse Hinds, [address] today's visits and meeting all other requirements  Future Appointments  No visits were found meeting these conditions. Showing future appointments within next 150 days and meeting all other requirements       The patient was identified by name and date of birth. Johnny Cline was informed that this is a telemedicine visit and that the [facility]. He agrees to proceed. .  My office door was closed. No one else was in the room. He acknowledged consent and understanding of privacy and security of the video platform. The patient has agreed to participate and understands they can discontinue the visit at any time. Patient is aware this is a billable service. Subjective  Johnny Cilne is a 29 y.o. male with OCD presents for f/u  .   Compliant with meds, SE akathisia(?)  Denies acute medical problems   Busy at work ( machine shop)  Failed buspar , neurontin      HPI   Anxiety - felt slightly better on risperdal in the beginning; continues ot c/o obsessive/ racing / intrusive thoughts; feeling restless; poor functioning  Better when at home, relaxed  Worse when busy at work  Mood - same; denies mood wings or depression   Past Medical History:   Diagnosis Date    GERD (gastroesophageal reflux disease)     Hypertension        History reviewed. No pertinent surgical history. Current Outpatient Medications   Medication Sig Dispense Refill    fluvoxaMINE (LUVOX) 100 mg tablet Take 3 tablets (300 mg total) by mouth daily at bedtime 90 tablet 1    hydrochlorothiazide (HYDRODIURIL) 25 mg tablet Take 25 mg by mouth daily      losartan (COZAAR) 50 mg tablet Take 50 mg by mouth daily      pantoprazole (PROTONIX) 40 mg tablet        No current facility-administered medications for this visit. Not on File    Review of Systems   Constitutional:  Negative for activity change and appetite change. Psychiatric/Behavioral:  Negative for sleep disturbance and suicidal ideas. The patient is nervous/anxious. Video Exam    There were no vitals filed for this visit. Physical Exam  Constitutional:       Appearance: Normal appearance. He is normal weight. Comments: Looks tired   Neurological:      Mental Status: He is alert. Psychiatric:         Attention and Perception: Attention and perception normal.         Mood and Affect: Mood is anxious. Affect is blunt. Speech: Speech normal.         Behavior: Behavior normal. Behavior is cooperative. Thought Content:  Thought content normal.         Judgment: Judgment normal.          Visit Time    Visit Start Time: 5.36 pm   Visit Stop Time: 5.42 pm   Total Visit Duration:  15 minutes

## 2023-11-30 ENCOUNTER — TELEMEDICINE (OUTPATIENT)
Dept: PSYCHIATRY | Facility: CLINIC | Age: 28
End: 2023-11-30
Payer: COMMERCIAL

## 2023-11-30 DIAGNOSIS — F31.9 BIPOLAR 1 DISORDER (HCC): ICD-10-CM

## 2023-11-30 DIAGNOSIS — F42.9 OBSESSIVE-COMPULSIVE DISORDER, UNSPECIFIED TYPE: ICD-10-CM

## 2023-11-30 PROCEDURE — 99214 OFFICE O/P EST MOD 30 MIN: CPT | Performed by: PSYCHIATRY & NEUROLOGY

## 2023-11-30 RX ORDER — RISPERIDONE 2 MG/1
2 TABLET ORAL 2 TIMES DAILY
Qty: 30 TABLET | Refills: 1 | Status: SHIPPED | OUTPATIENT
Start: 2023-11-30

## 2023-11-30 RX ORDER — ARIPIPRAZOLE 20 MG/1
20 TABLET ORAL DAILY
Qty: 30 TABLET | Refills: 1 | Status: SHIPPED | OUTPATIENT
Start: 2023-11-30

## 2023-11-30 RX ORDER — ARIPIPRAZOLE 5 MG/1
5 TABLET ORAL DAILY
Qty: 30 TABLET | Refills: 1 | Status: SHIPPED | OUTPATIENT
Start: 2023-11-30

## 2023-11-30 NOTE — PSYCH
Virtual Regular Visit    Verification of patient location:    Patient is located at Other in the following state in which I hold an active license [place]      Assessment/Plan:    Problem List Items Addressed This Visit    None      Goals addressed in session: Goal 1          Reason for visit is   Chief Complaint   Patient presents with    Medication Management        Encounter provider Tressa Lr MD    Provider located at [address] [place] [address],[address]  825 [facility] [address] [place] [address]  [phone]      Recent Visits  No visits were found meeting these conditions. Showing recent visits within past 7 days and meeting all other requirements  Today's Visits  Date Type Provider Dept   11/30/23 Telemedicine Tressa Lr, [address] today's visits and meeting all other requirements  Future Appointments  No visits were found meeting these conditions. Showing future appointments within next 150 days and meeting all other requirements       The patient was identified by name and date of birth. Anette Zambrano was informed that this is a telemedicine visit and [facility]. He agrees to proceed. .  My office door was closed. No one else was in the room. He acknowledged consent and understanding of privacy and security of the video platform. The patient has agreed to participate and understands they can discontinue the visit at any time. Patient is aware this is a billable service. Subjective  Anette Zambrano is a 29 y.o. male with OCD presents for regular f/u  .   On cross taper from abilify to risperdal  Smokes weed 2-3 x day  Denies acute medical problems      HPI   Anxiety - less obsessive / intrusive thoughts for 2 weeks, then " feeling the same"  Mood - stable; denies depression or mood swings    Past Medical History:   Diagnosis Date    GERD (gastroesophageal reflux disease) Hypertension        History reviewed. No pertinent surgical history. Current Outpatient Medications   Medication Sig Dispense Refill    ARIPiprazole (ABILIFY) 20 MG tablet Take 1 tablet (20 mg total) by mouth daily 30 tablet 1    ARIPiprazole (ABILIFY) 5 mg tablet Take 1 tablet (5 mg total) by mouth daily With 20 mg , total dose 25 mg 30 tablet 1    fluvoxaMINE (LUVOX) 100 mg tablet Take 3 tablets (300 mg total) by mouth daily at bedtime 90 tablet 1    hydrochlorothiazide (HYDRODIURIL) 25 mg tablet Take 25 mg by mouth daily      losartan (COZAAR) 50 mg tablet Take 50 mg by mouth daily      pantoprazole (PROTONIX) 40 mg tablet       risperiDONE (RisperDAL) 2 mg tablet Take 1 tablet (2 mg total) by mouth 2 (two) times a day 30 tablet 1     No current facility-administered medications for this visit. Not on File    Review of Systems   Constitutional:  Negative for activity change and appetite change. Psychiatric/Behavioral:  Negative for dysphoric mood, sleep disturbance and suicidal ideas. The patient is nervous/anxious. Video Exam    There were no vitals filed for this visit. Physical Exam  Constitutional:       Appearance: Normal appearance. He is normal weight. Neurological:      Mental Status: He is alert. Psychiatric:         Attention and Perception: Attention and perception normal.         Mood and Affect: Mood is anxious. Affect is blunt. Speech: Speech normal.         Behavior: Behavior normal. Behavior is cooperative. Thought Content:  Thought content normal.         Judgment: Judgment normal.          Visit Time    Visit Start Time: 4.36 pm   Visit Stop Time: 4.42 pm   Total Visit Duration:  15 minutes

## 2024-01-18 DIAGNOSIS — F32.A DEPRESSION, UNSPECIFIED DEPRESSION TYPE: ICD-10-CM

## 2024-01-18 RX ORDER — FLUVOXAMINE MALEATE 100 MG
300 TABLET ORAL
Qty: 90 TABLET | Refills: 0 | Status: SHIPPED | OUTPATIENT
Start: 2024-01-18

## 2024-01-18 NOTE — TELEPHONE ENCOUNTER
Female calling regarding patient needing RF. States patient was supposed to have had an appt but it was cancelled and no one has reached out yet to reschedule them. Patient in need of fluvoxamine RF-out of med.

## 2024-02-27 ENCOUNTER — TELEMEDICINE (OUTPATIENT)
Dept: PSYCHIATRY | Facility: CLINIC | Age: 29
End: 2024-02-27
Payer: COMMERCIAL

## 2024-02-27 DIAGNOSIS — F42.9 OBSESSIVE-COMPULSIVE DISORDER, UNSPECIFIED TYPE: Primary | ICD-10-CM

## 2024-02-27 DIAGNOSIS — F31.9 BIPOLAR 1 DISORDER (HCC): ICD-10-CM

## 2024-02-27 PROCEDURE — 99214 OFFICE O/P EST MOD 30 MIN: CPT | Performed by: PSYCHIATRY & NEUROLOGY

## 2024-02-27 RX ORDER — FLUVOXAMINE MALEATE 100 MG
300 TABLET ORAL
Qty: 90 TABLET | Refills: 1 | Status: SHIPPED | OUTPATIENT
Start: 2024-02-27

## 2024-02-27 RX ORDER — RISPERIDONE 3 MG/1
3 TABLET ORAL 2 TIMES DAILY
Qty: 60 TABLET | Refills: 1 | Status: SHIPPED | OUTPATIENT
Start: 2024-02-27

## 2024-02-27 RX ORDER — ARIPIPRAZOLE 5 MG/1
5 TABLET ORAL DAILY
Qty: 30 TABLET | Refills: 1 | Status: SHIPPED | OUTPATIENT
Start: 2024-02-27

## 2024-02-27 NOTE — PSYCH
"  Virtual Regular Visit    Verification of patient location:    Patient is located at Other in the following state in which I hold an active license [place]      Assessment/Plan:    Problem List Items Addressed This Visit    None      Goals addressed in session: Goal 1          Reason for visit is   Chief Complaint   Patient presents with    Medication Management        Encounter provider Kinga Lopez MD    Provider located at [facility] MENTAL HEALTH OUTPATIENT  [address]  [facility] [id number] [phone]      Recent Visits  No visits were found meeting these conditions.  Showing recent visits within past 7 days and meeting all other requirements  Today's Visits  Date Type Provider Dept   02/27/24 Telemedicine Kinga Lopez MD [facility]   Showing today's visits and meeting all other requirements  Future Appointments  No visits were found meeting these conditions.  Showing future appointments within next 150 days and meeting all other requirements       The patient was identified by name and date of birth. Bob Campos was informed that this is a telemedicine visit and that the visit is being conducted throughthe [employer] Emb[employer] platform. He agrees to proceed..  My office door was closed. No one else was in the room.  He acknowledged consent and understanding of privacy and security of the video platform. The patient has agreed to participate and understands they can discontinue the visit at any time.    Patient is aware this is a billable service.     Subjective  Bob Campos is a 29 y.o. male with OCD presents for f/u  .  Compliant with meds, denies SE  C/o vomiting - \" sometimes\" - PCP f/u scheduled  Pt reports not smoking weed for 2 weeks; denies alcohol use      HPI   Anxiety - continues to c/o intrusive negative thoughts; \" a lot\", but less intense. Pt reports \" thinking more clearly\"; doesn't get preoccupied with his thoughts and is able to function at " work and at home  Mood - stable; denies depression or mood swings  Sleep - difficult to fall asleep; racing thoughts sometimes  Pt reports feeling less anxious when he is not smoking weed  Past Medical History:   Diagnosis Date    GERD (gastroesophageal reflux disease)     Hypertension        History reviewed. No pertinent surgical history.    Current Outpatient Medications   Medication Sig Dispense Refill    ARIPiprazole (ABILIFY) 5 mg tablet Take 1 tablet (5 mg total) by mouth daily With 20 mg , total dose 25 mg 30 tablet 1    fluvoxaMINE (LUVOX) 100 mg tablet Take 3 tablets (300 mg total) by mouth daily at bedtime 90 tablet 0    risperiDONE (RisperDAL) 2 mg tablet Take 1 tablet (2 mg total) by mouth 2 (two) times a day 30 tablet 1    ARIPiprazole (ABILIFY) 20 MG tablet Take 1 tablet (20 mg total) by mouth daily 30 tablet 1    hydrochlorothiazide (HYDRODIURIL) 25 mg tablet Take 25 mg by mouth daily      losartan (COZAAR) 50 mg tablet Take 50 mg by mouth daily      pantoprazole (PROTONIX) 40 mg tablet        No current facility-administered medications for this visit.        Not on File    Review of Systems   Constitutional:  Negative for activity change and appetite change.   Psychiatric/Behavioral:  Positive for sleep disturbance. Negative for dysphoric mood and suicidal ideas. The patient is nervous/anxious.        Video Exam    There were no vitals filed for this visit.    Physical Exam  Constitutional:       Appearance: Normal appearance. He is normal weight.   Neurological:      Mental Status: He is alert.   Psychiatric:         Attention and Perception: Attention and perception normal.         Mood and Affect: Mood is anxious. Affect is blunt.         Speech: Speech normal.         Behavior: Behavior normal. Behavior is cooperative.         Thought Content: Thought content normal.         Judgment: Judgment normal.          Visit Time    Visit Start Time: 1.26 pm   Visit Stop Time: 1.35 pm   Total Visit  Duration:  20 minutes

## 2024-02-27 NOTE — PATIENT INSTRUCTIONS
Increase risperdal 3 mg bid  Continue abilify 5 mg and luvox 300 mg   Pt failed buspar, atarax  Pt was recommended to stop smoking weed  F/u with PCP re: vomiting

## 2024-04-18 ENCOUNTER — TELEMEDICINE (OUTPATIENT)
Dept: PSYCHIATRY | Facility: CLINIC | Age: 29
End: 2024-04-18
Payer: COMMERCIAL

## 2024-04-18 DIAGNOSIS — F42.9 OBSESSIVE-COMPULSIVE DISORDER, UNSPECIFIED TYPE: ICD-10-CM

## 2024-04-18 DIAGNOSIS — F31.9 BIPOLAR 1 DISORDER (HCC): ICD-10-CM

## 2024-04-18 PROCEDURE — 99214 OFFICE O/P EST MOD 30 MIN: CPT | Performed by: PSYCHIATRY & NEUROLOGY

## 2024-04-18 RX ORDER — ARIPIPRAZOLE 5 MG/1
5 TABLET ORAL DAILY
Qty: 30 TABLET | Refills: 1 | Status: SHIPPED | OUTPATIENT
Start: 2024-04-18

## 2024-04-18 RX ORDER — OLANZAPINE 2.5 MG/1
2.5 TABLET, FILM COATED ORAL
Qty: 30 TABLET | Refills: 1 | Status: SHIPPED | OUTPATIENT
Start: 2024-04-18

## 2024-04-18 RX ORDER — FLUVOXAMINE MALEATE 100 MG
300 TABLET ORAL
Qty: 90 TABLET | Refills: 1 | Status: SHIPPED | OUTPATIENT
Start: 2024-04-18

## 2024-04-18 NOTE — PSYCH
"  Virtual Regular Visit    Verification of patient location:    Patient is located at Home in the following state in which I hold an [place] license [place]      Assessment/Plan:    Problem List Items Addressed This Visit    None      Goals addressed in session: Goal 1          Reason for visit is   Chief Complaint   Patient presents with    Medication Management        Encounter provider Kinga Lopez MD    Provider located at [facility] MENTAL HEALTH OUTPATIENT  [address] [facility] [id number] [phone]      Recent Visits  No visits were found meeting these conditions.  Showing recent visits within past 7 days and meeting all other requirements  Today's Visits  Date Type Provider Dept   04/18/24 Telemedicine Kinga Lopez MD [facility]   Showing today's visits and meeting all other requirements  Future Appointments  No visits were found meeting these conditions.  Showing future appointments within next 150 days and meeting all other requirements       The patient was identified by name and date of birth. Bob Campos was informed that this is a telemedicine visit and that the visit is being conducted throughthe [employer] Embedded platform. He agrees to proceed..  My office door was closed. No one else was in the room.  He acknowledged consent and understanding of privacy and security of the video platform. The patient has agreed to participate and understands they can discontinue the visit at any time.    Patient is aware this is a billable service.     Subjective  Bob Campos is a 29 y.o. male with OCD presents for regular f/u  .  Compliant with meds, denies SE  Recent ER visit for \" eyes problems\" - as per pt, blood work and w/u - WNL  Pt continues to work  Smokes weed 2 x week    HPI   Anxiety - continues to c/o daily intrusive racing and obsessive thoughts; \" non stop\" - fear to get a disease; \" over think\"; \" over analyze\"; feeling \" not normal\"; \" who am " "I?\"; \" out of it\" and \" everything is not real\"  As per pt, current meds are not effective  Mood - sometimes gets sad, but does ADLs, is able to function at work  Psychosis - denies AH; somatically preoccupied - delusions?  Past Medical History:   Diagnosis Date    GERD (gastroesophageal reflux disease)     Hypertension        History reviewed. No pertinent surgical history.    Current Outpatient Medications   Medication Sig Dispense Refill    ARIPiprazole (ABILIFY) 5 mg tablet Take 1 tablet (5 mg total) by mouth daily With 20 mg , total dose 25 mg 30 tablet 1    fluvoxaMINE (LUVOX) 100 mg tablet Take 3 tablets (300 mg total) by mouth daily at bedtime 90 tablet 1    risperiDONE (RisperDAL) 3 mg tablet Take 1 tablet (3 mg total) by mouth 2 (two) times a day 60 tablet 1    hydrochlorothiazide (HYDRODIURIL) 25 mg tablet Take 25 mg by mouth daily      losartan (COZAAR) 50 mg tablet Take 50 mg by mouth daily      pantoprazole (PROTONIX) 40 mg tablet        No current facility-administered medications for this visit.        Not on File    Review of Systems   Constitutional:  Negative for activity change and appetite change.   Psychiatric/Behavioral:  Negative for sleep disturbance and suicidal ideas. The patient is nervous/anxious.        Video Exam    There were no vitals filed for this visit.    Physical Exam  Constitutional:       Appearance: Normal appearance. He is obese.   Neurological:      Mental Status: He is alert.   Psychiatric:         Attention and Perception: Attention and perception normal.         Mood and Affect: Mood is anxious. Affect is blunt.         Speech: Speech normal.         Behavior: Behavior normal. Behavior is cooperative.         Thought Content: Thought content is delusional (somatic).         Judgment: Judgment normal.          Visit Time    Visit Start Time: 5.57 pm   Visit Stop Time: 6.06 pm   Total Visit Duration:  20 minutes    TREATMENT PLAN (Medication Management Only)        [facility] " [facility]    Name and Date of Birth:  Bob Campos 29 y.o. 1995  Date of Treatment Plan: April 18, 2024  Diagnosis/Diagnoses:  No diagnosis found.  Strengths/Personal Resources for Self-Care: taking medications as prescribed, motivation for treatment.  Area/Areas of need (in own words): anxiety, intrusive thoughts  1. Long Term Goal: alleviate psychotic symptoms.  Target Date:6 months - 10/18/2024  Person/Persons responsible for completion of goal: Bob  2.  Short Term Objective (s) - How will we reach this goal?:   A. Provider new recommended medication/dosage changes and/or continue medication(s): continue current medications as prescribed Luvox, Abilify, Zyprexa.  B. N/A.  C. N/A.  Target Date:6 months - 10/18/2024  Person/Persons Responsible for Completion of Goal: Bob  Progress Towards Goals: continuing treatment  Treatment Modality: medication management every 4 weeks  Review due 180 days from date of this plan: 6 months - 10/18/2024  Expected length of service: ongoing treatment  My Physician/PA/NP and I have developed this plan together and I agree to work on the goals and objectives. I understand the treatment goals that were developed for my treatment.

## 2024-05-02 ENCOUNTER — TELEMEDICINE (OUTPATIENT)
Dept: PSYCHIATRY | Facility: CLINIC | Age: 29
End: 2024-05-02
Payer: COMMERCIAL

## 2024-05-02 DIAGNOSIS — F32.A DEPRESSION, UNSPECIFIED DEPRESSION TYPE: ICD-10-CM

## 2024-05-02 DIAGNOSIS — F42.9 OBSESSIVE-COMPULSIVE DISORDER, UNSPECIFIED TYPE: Primary | ICD-10-CM

## 2024-05-02 PROCEDURE — 99214 OFFICE O/P EST MOD 30 MIN: CPT | Performed by: PSYCHIATRY & NEUROLOGY

## 2024-05-02 RX ORDER — OLANZAPINE 5 MG/1
5 TABLET ORAL
Qty: 30 TABLET | Refills: 1 | Status: SHIPPED | OUTPATIENT
Start: 2024-05-02

## 2024-05-02 RX ORDER — ARIPIPRAZOLE 2 MG/1
2 TABLET ORAL DAILY
Qty: 30 TABLET | Refills: 1 | Status: SHIPPED | OUTPATIENT
Start: 2024-05-02

## 2024-05-02 NOTE — PSYCH
Virtual Regular Visit    Verification of patient location:    Patient is located at Home in the following state in which I hold an active license [place]      Assessment/Plan:    Problem List Items Addressed This Visit    None      Goals addressed in session: Goal 1          Reason for visit is   Chief Complaint   Patient presents with    Medication Management        Encounter provider Kinga Lopez MD      Recent Visits  No visits were found meeting these conditions.  Showing recent visits within past 7 days and meeting all other requirements  Today's Visits  Date Type Provider Dept   05/02/24 Telemedicine Kinga Lopez MD [facility]   Showing today's visits and meeting all other requirements  Future Appointments  No visits were found meeting these conditions.  Showing future appointments within next 150 days and meeting all other requirements       The patient was identified by name and date of birth. Bob Campos was informed that this is a telemedicine visit and that the visit is being conducted throughthe Epic Embedded platform. He agrees to proceed..  My office door was closed. No one else was in the room.  He acknowledged consent and understanding of privacy and security of the video platform. The patient has agreed to participate and understands they can discontinue the visit at any time.    Patient is aware this is a billable service.     Subjective  Bob Campos is a 29 y.o. male with OCD and psychosis presents for f/u  .  Started zyprexa; continued abilify am; denies SE  Denies acute medical problems or other stressors    HPI   Anxiety / psychosis - improving for the last 3 days - less intrusive racing / obsessive thoughts; feels calmer and more relaxed; less preoccupied; better functioning during the day  Mood - reports as good and stable; does ADLs  Past Medical History:   Diagnosis Date    GERD (gastroesophageal reflux disease)     Hypertension        History reviewed. No pertinent surgical  history.    Current Outpatient Medications   Medication Sig Dispense Refill    ARIPiprazole (ABILIFY) 5 mg tablet Take 1 tablet (5 mg total) by mouth daily With 20 mg , total dose 25 mg 30 tablet 1    fluvoxaMINE (LUVOX) 100 mg tablet Take 3 tablets (300 mg total) by mouth daily at bedtime 90 tablet 1    OLANZapine (ZyPREXA) 2.5 mg tablet Take 1 tablet (2.5 mg total) by mouth daily at bedtime 30 tablet 1    hydrochlorothiazide (HYDRODIURIL) 25 mg tablet Take 25 mg by mouth daily      losartan (COZAAR) 50 mg tablet Take 50 mg by mouth daily      pantoprazole (PROTONIX) 40 mg tablet        No current facility-administered medications for this visit.        Not on File    Review of Systems   Constitutional:  Negative for activity change and appetite change.   Psychiatric/Behavioral:  Negative for dysphoric mood, sleep disturbance and suicidal ideas. The patient is nervous/anxious.        Video Exam    There were no vitals filed for this visit.    Physical Exam  Constitutional:       Appearance: Normal appearance. He is normal weight.   Neurological:      Mental Status: He is alert.   Psychiatric:         Attention and Perception: Attention and perception normal.         Mood and Affect: Mood is anxious. Affect is blunt.         Speech: Speech normal.         Behavior: Behavior normal. Behavior is cooperative.         Thought Content: Thought content normal.         Judgment: Judgment normal.          Visit Time    Visit Start Time: 4.57 pm   Visit Stop Time: 5.05 pm   Total Visit Duration:  20 minutes

## 2024-06-28 ENCOUNTER — TELEPHONE (OUTPATIENT)
Dept: PSYCHIATRY | Facility: CLINIC | Age: 29
End: 2024-06-28

## 2024-06-28 NOTE — TELEPHONE ENCOUNTER
Writer spoke to client, verified email address to send Night Out code to and let him know that yearly paperwork was due. Writer also made sure client is aware that the Virtual Care Consent Form is asking for an emergency contact.    Client confirmed 7/2/24 appointment with Dr. Lopez.

## 2024-07-02 ENCOUNTER — TELEMEDICINE (OUTPATIENT)
Dept: PSYCHIATRY | Facility: CLINIC | Age: 29
End: 2024-07-02
Payer: COMMERCIAL

## 2024-07-02 DIAGNOSIS — F31.9 BIPOLAR 1 DISORDER (HCC): Primary | ICD-10-CM

## 2024-07-02 DIAGNOSIS — F42.9 OBSESSIVE-COMPULSIVE DISORDER, UNSPECIFIED TYPE: ICD-10-CM

## 2024-07-02 PROCEDURE — 99214 OFFICE O/P EST MOD 30 MIN: CPT | Performed by: PSYCHIATRY & NEUROLOGY

## 2024-07-02 RX ORDER — OLANZAPINE 5 MG/1
5 TABLET ORAL
Qty: 30 TABLET | Refills: 1 | Status: SHIPPED | OUTPATIENT
Start: 2024-07-02

## 2024-07-02 RX ORDER — FLUVOXAMINE MALEATE 100 MG
300 TABLET ORAL
Qty: 90 TABLET | Refills: 1 | Status: SHIPPED | OUTPATIENT
Start: 2024-07-02

## 2024-07-02 RX ORDER — OLANZAPINE 2.5 MG/1
2.5 TABLET, FILM COATED ORAL
Qty: 30 TABLET | Refills: 1 | Status: SHIPPED | OUTPATIENT
Start: 2024-07-02

## 2024-07-02 NOTE — PSYCH
Virtual Regular Visit    Verification of patient location:    Patient is located at Other in the following state in which I hold an active license [place]      Assessment/Plan:    Problem List Items Addressed This Visit    None      Goals addressed in session: Goal 1          Reason for visit is   Chief Complaint   Patient presents with    Medication Management        Encounter provider Kinga Lopez MD      Recent Visits  Date Type Provider Dept   06/28/24 [facility]phone [facility]   Showing recent visits within past 7 days and meeting all other requirements  Today's Visits  Date Type Provider Dept   07/02/24 [facility]   Showing today's visits and meeting all other requirements  Future Appointments  No visits were found meeting these conditions.  Showing future appointments within next 150 days and meeting all other requirements       The patient was identified by name and date of birth. Bob Campos was informed that this is a telemedicine visit and that the visit is being conducted throughthe Epic Embedded platform. He agrees to proceed..  My office door was closed. No one else was in the room.  He acknowledged consent and understanding of privacy and security of the video platform. The patient has agreed to participate and understands they can discontinue the visit at any time.    Patient is aware this is a billable service.     Subjective  Bob Campos is a 29 y.o. male with OCD presents for regular f/u  .  Compliant with meds, denies SE; on cross taper form abilify to zyprexa  Denies acute medical problems or other stressors    HPI   Anxiety - improving - obsessive/ intrusive thoughts are less severe, but still daily'; most of the time; interfering with his functioning  Denies panic attacks  Mood - stable; denies depression or mood swings  Past Medical History:   Diagnosis Date    GERD (gastroesophageal reflux disease)     Hypertension         History reviewed. No pertinent surgical history.    Current Outpatient Medications   Medication Sig Dispense Refill    hydrochlorothiazide (HYDRODIURIL) 25 mg tablet Take 25 mg by mouth daily      losartan (COZAAR) 50 mg tablet Take 50 mg by mouth daily      pantoprazole (PROTONIX) 40 mg tablet       ARIPiprazole (ABILIFY) 2 mg tablet Take 1 tablet (2 mg total) by mouth daily 30 tablet 1    fluvoxaMINE (LUVOX) 100 mg tablet Take 3 tablets (300 mg total) by mouth daily at bedtime 90 tablet 1    OLANZapine (ZyPREXA) 5 mg tablet Take 1 tablet (5 mg total) by mouth daily at bedtime 30 tablet 1     No current facility-administered medications for this visit.        Not on File    Review of Systems   Constitutional:  Negative for activity change and appetite change.   Psychiatric/Behavioral:  Negative for dysphoric mood, sleep disturbance and suicidal ideas. The patient is nervous/anxious.        Video Exam    There were no vitals filed for this visit.    Physical Exam  Constitutional:       Appearance: Normal appearance. He is normal weight.   Neurological:      Mental Status: He is alert.   Psychiatric:         Attention and Perception: Attention and perception normal.         Mood and Affect: Mood is anxious. Mood is not depressed. Affect is blunt.         Speech: Speech normal.         Behavior: Behavior normal. Behavior is cooperative.         Thought Content: Thought content normal.         Judgment: Judgment normal.          Visit Time    Visit Start Time: 4.27 pm   Visit Stop Time: 4.35 pm   Total Visit Duration:  20 minutes

## 2024-08-06 ENCOUNTER — TELEMEDICINE (OUTPATIENT)
Dept: PSYCHIATRY | Facility: CLINIC | Age: 29
End: 2024-08-06
Payer: COMMERCIAL

## 2024-08-06 DIAGNOSIS — F42.9 OBSESSIVE-COMPULSIVE DISORDER, UNSPECIFIED TYPE: Primary | ICD-10-CM

## 2024-08-06 DIAGNOSIS — F32.A DEPRESSION, UNSPECIFIED DEPRESSION TYPE: ICD-10-CM

## 2024-08-06 PROCEDURE — 99214 OFFICE O/P EST MOD 30 MIN: CPT | Performed by: PSYCHIATRY & NEUROLOGY

## 2024-08-06 RX ORDER — FLUVOXAMINE MALEATE 100 MG
300 TABLET ORAL
Qty: 90 TABLET | Refills: 1 | Status: SHIPPED | OUTPATIENT
Start: 2024-08-06

## 2024-08-06 RX ORDER — OLANZAPINE 10 MG/1
10 TABLET ORAL
Qty: 30 TABLET | Refills: 1 | Status: SHIPPED | OUTPATIENT
Start: 2024-08-06

## 2024-08-06 NOTE — PSYCH
"  Virtual Regular Visit    Verification of patient location:    Patient is located at Other in the following state in which I hold an active license [place]      Assessment/Plan:    Problem List Items Addressed This Visit    None      Goals addressed in session: Goal 1          Reason for visit is   Chief Complaint   Patient presents with    Medication Management        Encounter provider Kinga Lopez MD      Recent Visits  No visits were found meeting these conditions.  Showing recent visits within past 7 days and meeting all other requirements  Today's Visits  Date Type Provider Dept   08/06/24 Telemedicine Kinga Lopez MD [facility]   Showing today's visits and meeting all other requirements  Future Appointments  No visits were found meeting these conditions.  Showing future appointments within next 150 days and meeting all other requirements       The patient was identified by name and date of birth. Bob Campos was informed that this is a telemedicine visit and that the visit is being conducted throughthe Epic Embedded platform. He agrees to proceed..  My office door was closed. No one else was in the room.  He acknowledged consent and understanding of privacy and security of the video platform. The patient has agreed to participate and understands they can discontinue the visit at any time.  Appointment was started late b/o technical problems  Patient is aware this is a billable service.     Subjective  Bob Campos is a 29 y.o. male with OCD presents for regular f/u  .  Compliant with meds, denies SE  Denies acute medical problems or other stressors    HPI   Anxiety - continues to have intrusive obsessive thoughts; but they are less severe , less often, \" easier ot ignore them\"  Pt reports no anxiety during his recent vacation; work stress make it worse ( several hrs x day, less intense)  Pt is able to function in his daily routine  Mood - sometimes gets mild episodes of feeling sad  Past Medical " History:   Diagnosis Date    GERD (gastroesophageal reflux disease)     Hypertension        History reviewed. No pertinent surgical history.    Current Outpatient Medications   Medication Sig Dispense Refill    pantoprazole (PROTONIX) 40 mg tablet       fluvoxaMINE (LUVOX) 100 mg tablet Take 3 tablets (300 mg total) by mouth daily at bedtime 90 tablet 1    hydrochlorothiazide (HYDRODIURIL) 25 mg tablet Take 25 mg by mouth daily      losartan (COZAAR) 50 mg tablet Take 50 mg by mouth daily      OLANZapine (ZyPREXA) 2.5 mg tablet Take 1 tablet (2.5 mg total) by mouth daily at bedtime With 5 mg 30 tablet 1    OLANZapine (ZyPREXA) 5 mg tablet Take 1 tablet (5 mg total) by mouth daily at bedtime 30 tablet 1     No current facility-administered medications for this visit.        Not on File    Review of Systems   Constitutional:  Negative for activity change and appetite change.   Psychiatric/Behavioral:  Negative for dysphoric mood, sleep disturbance and suicidal ideas. The patient is nervous/anxious.        Video Exam    There were no vitals filed for this visit.    Physical Exam  Constitutional:       Appearance: Normal appearance.   Neurological:      Mental Status: He is alert.   Psychiatric:         Attention and Perception: Attention and perception normal.         Mood and Affect: Mood is anxious. Mood is not depressed. Affect is blunt.         Speech: Speech normal.         Behavior: Behavior normal. Behavior is cooperative.         Thought Content: Thought content normal.         Judgment: Judgment normal.          Visit Time    Visit Start Time: 9.42 am  Visit Stop Time: 9.50 am   Total Visit Duration:  16 minutes

## 2024-08-08 ENCOUNTER — TELEPHONE (OUTPATIENT)
Dept: PSYCHIATRY | Facility: CLINIC | Age: 29
End: 2024-08-08

## 2024-08-08 NOTE — TELEPHONE ENCOUNTER
Called and left message for client offering follow-up medication check appt. with Dr. Kinga Lopez on 10/9 at 10:00 am.    Client also put on cancellation list for sooner appt. in September.

## 2024-10-03 ENCOUNTER — TELEPHONE (OUTPATIENT)
Dept: BEHAVIORAL/MENTAL HEALTH CLINIC | Facility: CLINIC | Age: 29
End: 2024-10-03

## 2024-10-03 NOTE — TELEPHONE ENCOUNTER
LVM: Contacted client regarding [employer] insurance ending 9/30/24. Caller asked client to please call back with updated insurance information. Caller also informed client of self pay option if interested.

## 2024-10-08 NOTE — TELEPHONE ENCOUNTER
Lvm: Caller called client again regarding inactive [employer] insurance. Asked client to call back with updated insurance information, or if they are interested in self pay option.

## 2024-10-11 ENCOUNTER — TELEPHONE (OUTPATIENT)
Age: 29
End: 2024-10-11

## 2024-10-11 NOTE — TELEPHONE ENCOUNTER
Patient's parent/guardian contacted the office to schedule a follow up visit with provider. Patient is now scheduled for 11/6/24  at 4pm virtually.

## 2024-10-29 DIAGNOSIS — F32.A DEPRESSION, UNSPECIFIED DEPRESSION TYPE: ICD-10-CM

## 2024-10-29 DIAGNOSIS — F42.9 OBSESSIVE-COMPULSIVE DISORDER, UNSPECIFIED TYPE: ICD-10-CM

## 2024-10-29 RX ORDER — OLANZAPINE 10 MG/1
10 TABLET ORAL
Qty: 30 TABLET | Refills: 0 | Status: SHIPPED | OUTPATIENT
Start: 2024-10-29

## 2024-10-29 NOTE — TELEPHONE ENCOUNTER
Reason for call:   [x] Refill   [] Prior Auth  [] Other:     Office:   [] PCP/Provider -   [x] Specialty/Provider - [facility] [facility]  Authorized By: Kinga Lopez MD    Medication: OLANZapine (ZyPREXA) 10 mg tablet     Dose/Frequency: Take 1 tablet (10 mg total) by mouth daily at bedtime,     Quantity:  30 tablet     Pharmacy: [id number] [address]     Does the patient have enough for 3 days?   [] Yes   [x] No - Send as HP to POD

## 2024-11-06 ENCOUNTER — TELEPHONE (OUTPATIENT)
Age: 29
End: 2024-11-06

## 2024-11-06 ENCOUNTER — TELEPHONE (OUTPATIENT)
Dept: PSYCHIATRY | Facility: CLINIC | Age: 29
End: 2024-11-06

## 2024-11-06 NOTE — TELEPHONE ENCOUNTER
Patient is calling regarding cancelling an appointment.    Date/Time: 11/6/2024 at 4 pm    Reason: trouble with my chart ,unable to sign documents    Patient was rescheduled: YES [x] NO []  If yes, when was Patient reschedule for: 11/20/2024 at 8 am    Patient requesting call back to reschedule: YES [] NO [x]

## 2024-11-06 NOTE — TELEPHONE ENCOUNTER
Called and left message for client and also sent text and e-mail through visit messaging.    [employer] needs to be set-up and required documentation signed in [employer] [facility] [employer] [facility] prior to virtual appt. today at 4:00 pm with Dr. Kinga Lopez.

## 2024-11-15 ENCOUNTER — TELEPHONE (OUTPATIENT)
Dept: PSYCHIATRY | Facility: CLINIC | Age: 29
End: 2024-11-15

## 2024-11-15 NOTE — TELEPHONE ENCOUNTER
Writer called and left voicemail reminding client to complete paperwork and giving client [employer] helpdesk number as his appointment was previously rescheduled due to issues with the MyChart.    Writer also reminded client that the link has been sent to his email on file.

## 2024-12-04 DIAGNOSIS — F42.9 OBSESSIVE-COMPULSIVE DISORDER, UNSPECIFIED TYPE: ICD-10-CM

## 2024-12-04 DIAGNOSIS — F32.A DEPRESSION, UNSPECIFIED DEPRESSION TYPE: ICD-10-CM

## 2024-12-04 RX ORDER — OLANZAPINE 10 MG/1
10 TABLET ORAL
Qty: 30 TABLET | Refills: 0 | Status: SHIPPED | OUTPATIENT
Start: 2024-12-04

## 2024-12-20 ENCOUNTER — TELEPHONE (OUTPATIENT)
Dept: PSYCHIATRY | Facility: CLINIC | Age: 29
End: 2024-12-20

## 2024-12-20 NOTE — TELEPHONE ENCOUNTER
Writer left voicemail detailing where to find the rest of client's MyChart forms and asked him to call back to schedule his follow up with anticipation that he is able to fill them out as Dr. Lopez is booking closer to February now.

## 2024-12-30 DIAGNOSIS — F42.9 OBSESSIVE-COMPULSIVE DISORDER, UNSPECIFIED TYPE: ICD-10-CM

## 2024-12-31 RX ORDER — FLUVOXAMINE MALEATE 100 MG
TABLET ORAL
Qty: 90 TABLET | Refills: 1 | Status: SHIPPED | OUTPATIENT
Start: 2024-12-31

## 2025-01-10 DIAGNOSIS — F42.9 OBSESSIVE-COMPULSIVE DISORDER, UNSPECIFIED TYPE: ICD-10-CM

## 2025-01-10 DIAGNOSIS — F32.A DEPRESSION, UNSPECIFIED DEPRESSION TYPE: ICD-10-CM

## 2025-01-10 NOTE — TELEPHONE ENCOUNTER
Reason for call:   [x] Refill   [] Prior Auth  [] Other:     Office:   [] PCP/Provider -   [x] Specialty/Provider - [facility]    Medication: OLANZAPINE    Dose/Frequency: 10 MG    Quantity: 30    Pharmacy:   [facility] #[id number] - [place] - [address]  [address] [place] [address]  Phone: [phone]  Fax: [phone]     Does the patient have enough for 3 days?   [] Yes   [x] No - Send as HP to POD

## 2025-01-13 RX ORDER — OLANZAPINE 10 MG/1
10 TABLET ORAL
Qty: 30 TABLET | Refills: 0 | OUTPATIENT
Start: 2025-01-13

## 2025-01-14 ENCOUNTER — TELEPHONE (OUTPATIENT)
Dept: PSYCHIATRY | Facility: CLINIC | Age: 30
End: 2025-01-14

## 2025-01-14 ENCOUNTER — TELEMEDICINE (OUTPATIENT)
Dept: PSYCHIATRY | Facility: CLINIC | Age: 30
End: 2025-01-14
Payer: COMMERCIAL

## 2025-01-14 DIAGNOSIS — F42.9 OBSESSIVE-COMPULSIVE DISORDER, UNSPECIFIED TYPE: ICD-10-CM

## 2025-01-14 DIAGNOSIS — F31.9 BIPOLAR 1 DISORDER (HCC): Primary | ICD-10-CM

## 2025-01-14 PROCEDURE — 99214 OFFICE O/P EST MOD 30 MIN: CPT | Performed by: PSYCHIATRY & NEUROLOGY

## 2025-01-14 RX ORDER — OLANZAPINE 15 MG/1
15 TABLET ORAL
Qty: 30 TABLET | Refills: 1 | Status: SHIPPED | OUTPATIENT
Start: 2025-01-14

## 2025-01-14 RX ORDER — OLANZAPINE 10 MG/1
10 TABLET ORAL
Qty: 30 TABLET | Refills: 0 | OUTPATIENT
Start: 2025-01-14

## 2025-01-14 RX ORDER — FLUVOXAMINE MALEATE 100 MG
TABLET ORAL
Qty: 90 TABLET | Refills: 1 | Status: SHIPPED | OUTPATIENT
Start: 2025-01-14

## 2025-01-14 NOTE — PSYCH
Virtual Regular Visit    Verification of patient location:    Patient is located at Other in the following state in which I hold an active license [place]      Assessment/Plan:  Assessment & Plan  Obsessive-compulsive disorder, unspecified type    Orders:    fluvoxaMINE (LUVOX) 100 mg tablet; Take 3 tab at night    Bipolar 1 disorder (HCC)    Orders:    OLANZapine (ZyPREXA) 15 mg tablet; Take 1 tablet (15 mg total) by mouth daily at bedtime       Problem List Items Addressed This Visit       Obsessive-compulsive disorder, unspecified    Relevant Medications    fluvoxaMINE (LUVOX) 100 mg tablet    OLANZapine (ZyPREXA) 15 mg tablet     Other Visit Diagnoses         Bipolar 1 disorder (HCC)    -  Primary    Relevant Medications    fluvoxaMINE (LUVOX) 100 mg tablet    OLANZapine (ZyPREXA) 15 mg tablet            Goals addressed in session: Goal 1     Depression Follow-up Plan Completed: Not applicable    Reason for visit is   Chief Complaint   Patient presents with    Medication Management        Encounter provider Kinga Lopez MD      Recent Visits  No visits were found meeting these conditions.  Showing recent visits within past 7 days and meeting all other requirements  Today's Visits  Date Type Provider Dept   01/14/25 Telemedicine Kinga Lopez MD [facility]   Showing today's visits and meeting all other requirements  Future Appointments  No visits were found meeting these conditions.  Showing future appointments within next 150 days and meeting all other requirements       The patient was identified by name and date of birth. Bob Campos was informed that this is a telemedicine visit and that the visit is being conducted throughthe Epic Embedded platform. He agrees to proceed..  My office door was closed. No one else was in the room.  He acknowledged consent and understanding of privacy and security of the video platform. The patient has agreed to participate and understands they can discontinue the  visit at any time.    Patient is aware this is a billable service.     Subjective  Bob Campos is a 29 y.o. male with bipolar do and OCD presents for regular f/u  .  Compliant with meds, denies SE  I reviewed the chart  Last appt in August, pt had meds supply  S/p broken leg 2 months ago - PT; recovered  Pt works ( making parts)- c/o noise; uses headphones  MMJ - 4-5 x day   HPI   Pt reports doing better on zyprexa - continues to have obsessive/ intrusive thoughts , mostly at work , but these thoughts are less severe, more manageable; pt is able to function well at work  Pt reports less anticipatory anxiety; feels calmer, more relaxed  He denies depression or mood swings  Sleep - good, 8 hrs  Past Medical History:   Diagnosis Date    GERD (gastroesophageal reflux disease)     Hypertension        History reviewed. No pertinent surgical history.    Current Outpatient Medications   Medication Sig Dispense Refill    fluvoxaMINE (LUVOX) 100 mg tablet Take 3 tab at night 90 tablet 1    OLANZapine (ZyPREXA) 15 mg tablet Take 1 tablet (15 mg total) by mouth daily at bedtime 30 tablet 1    hydrochlorothiazide (HYDRODIURIL) 25 mg tablet Take 25 mg by mouth daily      losartan (COZAAR) 50 mg tablet Take 50 mg by mouth daily      pantoprazole (PROTONIX) 40 mg tablet        No current facility-administered medications for this visit.        Not on File    Review of Systems   Constitutional:  Negative for activity change and appetite change.   Psychiatric/Behavioral:  Negative for dysphoric mood, sleep disturbance and suicidal ideas. The patient is not nervous/anxious.        Video Exam    There were no vitals filed for this visit.    Physical Exam  Constitutional:       Appearance: Normal appearance.   Neurological:      Mental Status: He is alert.   Psychiatric:         Attention and Perception: Attention and perception normal.         Mood and Affect: Mood is anxious. Mood is not depressed. Affect is blunt.         Speech:  Speech normal.         Behavior: Behavior normal. Behavior is cooperative.         Thought Content: Thought content normal.         Judgment: Judgment normal.          Visit Time  Face to face  Visit Start Time: 4.00 pm   Visit Stop Time: 4.08 pm   Total Visit Duration:  20 minutes total spent in patient care

## 2025-01-14 NOTE — TELEPHONE ENCOUNTER
Writer left voicemail scheduling client for 2/12/25 at 3 pm based on closest to client's usual later time. Writer asked client to call back if time/date does not work.

## 2025-01-14 NOTE — BH TREATMENT PLAN
TREATMENT PLAN (Medication Management Only)        [facility]    Name and Date of Birth:  Bob Campos 29 y.o. 1995  Date of Treatment Plan: January 14, 2025  Diagnosis/Diagnoses:    1. Bipolar 1 disorder (HCC)    2. Obsessive-compulsive disorder, unspecified type      Strengths/Personal Resources for Self-Care: taking medications as prescribed, motivation for treatment.  Area/Areas of need (in own words): anxiety  1. Long Term Goal: improve control of anxiety.  Target Date:6 months - 7/14/2025  Person/Persons responsible for completion of goal: Bob  2.  Short Term Objective (s) - How will we reach this goal?:   A. Provider new recommended medication/dosage changes and/or continue medication(s): continue current medications as prescribed Luvox, Zyprexa.  B. N/A.  C. N/A.  Target Date:6 months - 7/14/2025  Person/Persons Responsible for Completion of Goal: Bob  Progress Towards Goals: continuing treatment  Treatment Modality: medication management every 4 weeks  Review due 180 days from date of this plan: 6 months - 7/14/2025  Expected length of service: ongoing treatment  My Physician/PA/NP and I have developed this plan together and I agree to work on the goals and objectives. I understand the treatment goals that were developed for my treatment.

## 2025-02-12 ENCOUNTER — TELEMEDICINE (OUTPATIENT)
Dept: PSYCHIATRY | Facility: CLINIC | Age: 30
End: 2025-02-12
Payer: COMMERCIAL

## 2025-02-12 ENCOUNTER — TELEPHONE (OUTPATIENT)
Dept: PSYCHIATRY | Facility: CLINIC | Age: 30
End: 2025-02-12

## 2025-02-12 DIAGNOSIS — F31.9 BIPOLAR 1 DISORDER (HCC): ICD-10-CM

## 2025-02-12 DIAGNOSIS — F42.9 OBSESSIVE-COMPULSIVE DISORDER, UNSPECIFIED TYPE: ICD-10-CM

## 2025-02-12 PROCEDURE — 99214 OFFICE O/P EST MOD 30 MIN: CPT | Performed by: PSYCHIATRY & NEUROLOGY

## 2025-02-12 RX ORDER — FLUVOXAMINE MALEATE 100 MG
TABLET ORAL
Qty: 90 TABLET | Refills: 2 | Status: SHIPPED | OUTPATIENT
Start: 2025-02-12

## 2025-02-12 RX ORDER — OLANZAPINE 15 MG/1
15 TABLET ORAL
Qty: 30 TABLET | Refills: 1 | Status: SHIPPED | OUTPATIENT
Start: 2025-02-12 | End: 2025-02-12

## 2025-02-12 RX ORDER — FLUVOXAMINE MALEATE 100 MG
TABLET ORAL
Qty: 90 TABLET | Refills: 1 | Status: SHIPPED | OUTPATIENT
Start: 2025-02-12 | End: 2025-02-12

## 2025-02-12 RX ORDER — OLANZAPINE 15 MG/1
15 TABLET ORAL
Qty: 30 TABLET | Refills: 2 | Status: SHIPPED | OUTPATIENT
Start: 2025-02-12

## 2025-02-12 NOTE — TELEPHONE ENCOUNTER
Writer left voicemail for client scheduling his follow up with Dr. Lopez and asked client to call back if time/date did not work.

## 2025-02-12 NOTE — PSYCH
Virtual Regular Visit    Verification of patient location:    Patient is located at Other in the following state in which I hold an active license [place]      Assessment/Plan:  Assessment & Plan  Bipolar 1 disorder (HCC)    Orders:    OLANZapine (ZyPREXA) 15 mg tablet; Take 1 tablet (15 mg total) by mouth daily at bedtime    Obsessive-compulsive disorder, unspecified type    Orders:    fluvoxaMINE (LUVOX) 100 mg tablet; Take 3 tab at night       Problem List Items Addressed This Visit       Obsessive-compulsive disorder, unspecified    Relevant Medications    OLANZapine (ZyPREXA) 15 mg tablet    fluvoxaMINE (LUVOX) 100 mg tablet     Other Visit Diagnoses         Bipolar 1 disorder (HCC)        Relevant Medications    OLANZapine (ZyPREXA) 15 mg tablet    fluvoxaMINE (LUVOX) 100 mg tablet            Goals addressed in session: Goal 1     Depression Follow-up Plan Completed: Not applicable    Reason for visit is   Chief Complaint   Patient presents with    Medication Management        Encounter provider Kinga Lopez MD      Recent Visits  No visits were found meeting these conditions.  Showing recent visits within past 7 days and meeting all other requirements  Today's Visits  Date Type Provider Dept   02/12/25 Telemedicine Kinga Lopez MD [facility]   Showing today's visits and meeting all other requirements  Future Appointments  No visits were found meeting these conditions.  Showing future appointments within next 150 days and meeting all other requirements       The patient was identified by name and date of birth. Bob Campos was informed that this is a telemedicine visit and that the visit is being conducted throughthe Epic Embedded platform. He agrees to proceed..  My office door was closed. No one else was in the room.  He acknowledged consent and understanding of privacy and security of the video platform. The patient has agreed to participate and understands they can discontinue the visit at any  time.    Patient is aware this is a billable service.     Subjective  Bob Campos is a 30 y.o. male with bipolar do and OCD presents for f/u  .  Started zyprexa 15 mg hs, SE tired in the morning; continued luvox  I reviewed the chart  Pt denies acute medical problems or other stressors  HPI   Mood - feels tired in the morning ( most likely SE); better in the afternoon. Mood is mostly stable; not depressed; does ADLs and functions close to baseline  Anxiety - improved - racing thoughts are less intense, better controlled; still happen at work. Pt is bale to ignore them; functions well at work. He reports feeling more relaxed. Denies panic attacks  Sleep - 8-9 hrs  Past Medical History:   Diagnosis Date    GERD (gastroesophageal reflux disease)     Hypertension        History reviewed. No pertinent surgical history.    Current Outpatient Medications   Medication Sig Dispense Refill    fluvoxaMINE (LUVOX) 100 mg tablet Take 3 tab at night 90 tablet 2    OLANZapine (ZyPREXA) 15 mg tablet Take 1 tablet (15 mg total) by mouth daily at bedtime 30 tablet 2    hydrochlorothiazide (HYDRODIURIL) 25 mg tablet Take 25 mg by mouth daily      losartan (COZAAR) 50 mg tablet Take 50 mg by mouth daily      pantoprazole (PROTONIX) 40 mg tablet        No current facility-administered medications for this visit.        Not on File    Review of Systems   Constitutional:  Negative for activity change and appetite change.   Psychiatric/Behavioral:  Negative for dysphoric mood, sleep disturbance and suicidal ideas. The patient is not nervous/anxious.        Video Exam    There were no vitals filed for this visit.    Physical Exam  Constitutional:       Appearance: Normal appearance.   Neurological:      Mental Status: He is alert.   Psychiatric:         Attention and Perception: Attention and perception normal.         Mood and Affect: Mood normal. Affect is blunt.         Speech: Speech normal.         Behavior: Behavior normal. Behavior  is cooperative.         Thought Content: Thought content normal.         Judgment: Judgment normal.          Visit Time  Face to face  Visit Start Time: 3.00 pm   Visit Stop Time: 3.09 pm   Total Visit Duration:  20 minutes total spent in patient care

## 2025-02-26 DIAGNOSIS — Z79.899 ENCOUNTER FOR LONG-TERM (CURRENT) USE OF MEDICATIONS: Primary | ICD-10-CM

## 2025-04-26 DIAGNOSIS — F42.9 OBSESSIVE-COMPULSIVE DISORDER, UNSPECIFIED TYPE: ICD-10-CM

## 2025-04-28 RX ORDER — FLUVOXAMINE MALEATE 100 MG
TABLET ORAL
Qty: 90 TABLET | Refills: 2 | Status: SHIPPED | OUTPATIENT
Start: 2025-04-28

## 2025-05-13 ENCOUNTER — TELEMEDICINE (OUTPATIENT)
Dept: PSYCHIATRY | Facility: CLINIC | Age: 30
End: 2025-05-13
Payer: COMMERCIAL

## 2025-05-13 DIAGNOSIS — F31.9 BIPOLAR 1 DISORDER (HCC): ICD-10-CM

## 2025-05-13 DIAGNOSIS — F42.9 OBSESSIVE-COMPULSIVE DISORDER, UNSPECIFIED TYPE: ICD-10-CM

## 2025-05-13 PROCEDURE — 99214 OFFICE O/P EST MOD 30 MIN: CPT | Performed by: PSYCHIATRY & NEUROLOGY

## 2025-05-13 RX ORDER — FLUVOXAMINE MALEATE 100 MG
TABLET ORAL
Qty: 90 TABLET | Refills: 2 | Status: SHIPPED | OUTPATIENT
Start: 2025-05-13

## 2025-05-13 RX ORDER — OLANZAPINE 15 MG/1
15 TABLET, FILM COATED ORAL
Qty: 30 TABLET | Refills: 2 | Status: SHIPPED | OUTPATIENT
Start: 2025-05-13

## 2025-05-13 NOTE — PSYCH
"MEDICATION MANAGEMENT NOTE    Name: Bob Campos      : 1995      MRN: [id number]  Encounter Provider: Kinga Lopez MD  Encounter Date: 2025   Encounter department: [facility]    Insurance: Payor: [employer] CROSS / Plan: [employer] BC PLAN 361 / Product Type: Blue Fee for Service /      Reason for Visit:   Chief Complaint   Patient presents with    Medication Management   :  Assessment & Plan  Bipolar 1 disorder (HCC)         Obsessive-compulsive disorder, unspecified type             Treatment Recommendations:    Educated about diagnosis and treatment modalities. Verbalizes understanding and agreement with the treatment plan.  Discussed self monitoring of symptoms, and symptom monitoring tools.  Discussed medications and if treatment adjustment was needed or desired.  I am scheduling this patient out for greater than 3 months: No    Medications Risks/Benefits:      Risks, Benefits And Possible Side Effects Of Medications:    Risks, benefits, and possible side effects of medications explained to Bob and he (or legal representative) verbalizes understanding and agreement for treatment.    Controlled Medication Discussion:     Not applicable      History of Present Illness       Pt presents for regular f/u   Compliant with meds, SE sedation ( \" manageable\")  Pt denies acute medical problems  Continues to work  Mood - 2 x week, for 1-2 hrs - episodes of negative intrusive thoughts; past regrets; low self esteem. Gets tired in the morning; better by afternoon.  Pt continues to do ADLs and functions close to baseline  Anxiety - improved; less intrusive thoughts during the day; less preoccupied and better functioning.  Sleep, appetite - normal  Review Of Systems: A review of systems is obtained and is negative except for the pertinent positives listed in HPI/Subjective above.      Current Rating Scores:         Areas of Improvement: reviewed in HPI/Subjective " Section      Past Medical History:   Diagnosis Date    GERD (gastroesophageal reflux disease)     Hypertension      History reviewed. No pertinent surgical history.  Allergies: Not on File    Current Outpatient Medications   Medication Instructions    fluvoxaMINE (LUVOX) 100 mg tablet TAKE 3 TABLETS BY MOUTH AT NIGHT    hydroCHLOROthiazide 25 mg, Oral, Daily    losartan (COZAAR) 50 mg, Oral, Daily    OLANZapine (ZYPREXA) 15 mg, Oral, Daily at bedtime    pantoprazole (PROTONIX) 40 mg tablet No dose, route, or frequency recorded.        Substance Abuse History:    Tobacco, Alcohol and Drug Use History     Tobacco Use    Smoking status: Not on file    Smokeless tobacco: Not on file   Substance Use Topics    Alcohol use: Not on file    Drug use: Not on file     Alcohol Use: Not At Risk (7/11/2020)    [facility]    AUDIT-C     Frequency of Alcohol Consumption: Never       Social History:    Social History     Socioeconomic History    Marital status: Single     Spouse name: Not on file    Number of children: Not on file    Years of education: Not on file    Highest education level: Not on file   Occupational History    Not on file   Other Topics Concern    Not on file   Social History Narrative    Not on file        Family Psychiatric History:     History reviewed. No pertinent family history.    Medical History Reviewed by provider this encounter:  Meds  Problems  Med Hx  Surg Hx  Fam Hx          Objective   There were no vitals taken for this visit.     Mental Status Evaluation:    Appearance age appropriate, casually dressed   Behavior cooperative   Speech Not very talkative at baseline   Mood euthymic   Affect constricted   Thought Processes organized   Thought Content no overt delusions   Perceptual Disturbances: no auditory hallucinations, no visual hallucinations   Abnormal Thoughts  Risk Potential Suicidal ideation - None  Homicidal  ideation - None  Potential for aggression - No   Orientation normal   Memory recent and remote memory grossly intact   Consciousness alert and awake   Attention Span Concentration Span attention span and concentration are age appropriate   Intellect appears to be of average intelligence   Insight limited   Judgement limited   Muscle Strength and  Gait unable to assess today due to virtual visit   Motor activity unable to assess today due to virtual visit   Language no difficulty naming common objects   Fund of Knowledge adequate knowledge of current events       Laboratory Results: I have personally reviewed all pertinent laboratory/tests results        Suicide/Homicide Risk Assessment:    Risk of Harm to Self:  Based on today's assessment, Bob presents the following risk of harm to self: none    Risk of Harm to Others:  Based on today's assessment, Bob presents the following risk of harm to others: none    The following interventions are recommended: Continue medication management.    Psychotherapy Provided:     Individual psychotherapy provided: No    Treatment Plan:    Completed and signed during the session: Not applicable - Treatment Plan not due at this session.    Goals: Progress towards Treatment Plan goals - Yes, moderate progress, as evidenced by subjective findings in HPI/Subjective Section    Depression Follow-up Plan Completed: Not applicable    Note Share:        Administrative Statements   Administrative Statements   Encounter provider Kinga Lopez MD    The Patient is located at Other and in the following state in which I hold an active license [place].    The patient was identified by name and date of birth. Bob Campos was informed that this is a telemedicine visit and that the visit is being conducted through the Epic Embedded platform. He agrees to proceed..  My office door was closed. No one else was in the room.  He acknowledged consent and understanding of privacy and security of the video  platform. The patient has agreed to participate and understands they can discontinue the visit at any time.    I have spent a total time of 20 minutes in caring for this patient on the day of the visit/encounter including Risks and benefits of tx options, Instructions for management, Documenting in the medical record, and Reviewing/placing orders in the medical record (including tests, medications, and/or procedures), not including the time spent for establishing the audio/video connection.    Visit Time  Face to face  Visit Start Time: 4.30 p m  Visit Stop Time: 4.39 pm   Total Visit Duration:  20 minutes total spent in patient care        Kinga Lopez MD 05/13/25

## 2025-05-14 ENCOUNTER — TELEPHONE (OUTPATIENT)
Dept: PSYCHIATRY | Facility: CLINIC | Age: 30
End: 2025-05-14

## 2025-05-14 NOTE — TELEPHONE ENCOUNTER
Writer left voicemail scheduling client's 3 month follow up for 8/12/25 at 4:30 pm and asked client to call back if time/date does not work.

## 2025-08-12 ENCOUNTER — TELEMEDICINE (OUTPATIENT)
Dept: PSYCHIATRY | Facility: CLINIC | Age: 30
End: 2025-08-12
Payer: COMMERCIAL

## 2025-08-13 ENCOUNTER — TELEPHONE (OUTPATIENT)
Dept: PSYCHIATRY | Facility: CLINIC | Age: 30
End: 2025-08-13